# Patient Record
Sex: FEMALE | Race: WHITE | Employment: OTHER | URBAN - METROPOLITAN AREA
[De-identification: names, ages, dates, MRNs, and addresses within clinical notes are randomized per-mention and may not be internally consistent; named-entity substitution may affect disease eponyms.]

---

## 2018-08-13 ENCOUNTER — HOSPITAL ENCOUNTER (OUTPATIENT)
Dept: DIABETES SERVICES | Age: 73
Discharge: HOME | End: 2018-08-13
Attending: FAMILY MEDICINE
Payer: MEDICARE

## 2018-08-13 DIAGNOSIS — E11.9 TYPE 2 DIABETES MELLITUS WITHOUT COMPLICATION, WITHOUT LONG-TERM CURRENT USE OF INSULIN (HCC): ICD-10-CM

## 2018-08-13 PROCEDURE — G0109 DIAB MANAGE TRN IND/GROUP: HCPCS

## 2018-08-16 ENCOUNTER — HOSPITAL ENCOUNTER (OUTPATIENT)
Dept: ULTRASOUND IMAGING | Age: 73
Discharge: HOME | End: 2018-08-16
Attending: INTERNAL MEDICINE
Payer: MEDICARE

## 2018-08-16 DIAGNOSIS — N18.4 CHRONIC KIDNEY DISEASE, STAGE IV (SEVERE) (HCC): ICD-10-CM

## 2018-08-16 PROCEDURE — 76770 US EXAM ABDO BACK WALL COMP: CPT

## 2018-08-20 ENCOUNTER — HOSPITAL ENCOUNTER (OUTPATIENT)
Dept: CT IMAGING | Age: 73
Discharge: HOME | End: 2018-08-20
Attending: INTERNAL MEDICINE

## 2018-08-20 DIAGNOSIS — R93.89 ULTRASOUND SCAN ABNORMAL: ICD-10-CM

## 2018-08-20 DIAGNOSIS — N28.89 KIDNEY MASS: ICD-10-CM

## 2018-08-30 ENCOUNTER — HOSPITAL ENCOUNTER (OUTPATIENT)
Dept: CT IMAGING | Age: 73
Discharge: HOME | End: 2018-08-30
Attending: INTERNAL MEDICINE
Payer: MEDICARE

## 2018-08-30 PROCEDURE — 74150 CT ABDOMEN W/O CONTRAST: CPT

## 2018-09-13 ENCOUNTER — OFFICE VISIT (OUTPATIENT)
Dept: CARDIOLOGY CLINIC | Age: 73
End: 2018-09-13

## 2018-09-13 VITALS
BODY MASS INDEX: 42.51 KG/M2 | HEART RATE: 62 BPM | OXYGEN SATURATION: 95 % | HEIGHT: 64 IN | RESPIRATION RATE: 18 BRPM | SYSTOLIC BLOOD PRESSURE: 132 MMHG | WEIGHT: 249 LBS | DIASTOLIC BLOOD PRESSURE: 64 MMHG

## 2018-09-13 DIAGNOSIS — Z87.891 HISTORY OF TOBACCO USE: ICD-10-CM

## 2018-09-13 DIAGNOSIS — I25.10 CORONARY ARTERY CALCIFICATION SEEN ON CAT SCAN: Primary | ICD-10-CM

## 2018-09-13 DIAGNOSIS — R06.02 SHORT OF BREATH ON EXERTION: ICD-10-CM

## 2018-09-13 DIAGNOSIS — N18.4 CKD (CHRONIC KIDNEY DISEASE) STAGE 4, GFR 15-29 ML/MIN (HCC): ICD-10-CM

## 2018-09-13 DIAGNOSIS — I10 ESSENTIAL HYPERTENSION: ICD-10-CM

## 2018-09-13 PROBLEM — E66.01 OBESITY, MORBID (HCC): Status: ACTIVE | Noted: 2018-09-13

## 2018-09-13 RX ORDER — ASPIRIN 81 MG/1
TABLET ORAL DAILY
COMMUNITY

## 2018-09-13 RX ORDER — ROSUVASTATIN CALCIUM 5 MG/1
5 TABLET, COATED ORAL EVERY OTHER DAY
COMMUNITY
End: 2018-09-13 | Stop reason: SDUPTHER

## 2018-09-13 RX ORDER — CLONIDINE HYDROCHLORIDE 0.2 MG/1
TABLET ORAL 2 TIMES DAILY
COMMUNITY

## 2018-09-13 RX ORDER — ROSUVASTATIN CALCIUM 5 MG/1
5 TABLET, COATED ORAL EVERY OTHER DAY
Qty: 45 TAB | Refills: 3 | Status: SHIPPED | OUTPATIENT
Start: 2018-09-13

## 2018-09-13 RX ORDER — ATENOLOL 25 MG/1
25 TABLET ORAL DAILY
COMMUNITY

## 2018-09-13 RX ORDER — FUROSEMIDE 40 MG/1
TABLET ORAL DAILY
COMMUNITY

## 2018-09-13 RX ORDER — DOXAZOSIN 2 MG/1
1 TABLET ORAL DAILY
COMMUNITY

## 2018-09-13 RX ORDER — CHOLECALCIFEROL TAB 125 MCG (5000 UNIT) 125 MCG
TAB ORAL DAILY
COMMUNITY

## 2018-09-13 RX ORDER — LANOLIN ALCOHOL/MO/W.PET/CERES
500 CREAM (GRAM) TOPICAL DAILY
COMMUNITY

## 2018-09-13 RX ORDER — GLIMEPIRIDE 4 MG/1
TABLET ORAL
COMMUNITY

## 2018-09-13 RX ORDER — DULOXETIN HYDROCHLORIDE 60 MG/1
60 CAPSULE, DELAYED RELEASE ORAL DAILY
COMMUNITY

## 2018-09-13 RX ORDER — AMLODIPINE BESYLATE 5 MG/1
5 TABLET ORAL DAILY
COMMUNITY

## 2018-09-13 RX ORDER — IRBESARTAN 300 MG/1
300 TABLET ORAL
COMMUNITY

## 2018-09-13 RX ORDER — CETIRIZINE HCL 10 MG
TABLET ORAL DAILY
COMMUNITY

## 2018-09-13 RX ORDER — ALLOPURINOL 300 MG/1
TABLET ORAL DAILY
COMMUNITY

## 2018-09-13 NOTE — PROGRESS NOTES
[place]: Singh  [phone]    History of Present Illness:   Ms. Jay Scott is a 73 yo F with a history of chronic kidney disease stage 4 with baseline creatinine of 1.8 - 2.2, history of type 2 diabetes and long NSAID use, secondary hyperparathyroidism, history of CVA in 2013 and subsequent CEA on the left side in 2013, history of knee replacement in 2010, history of tobacco use quit many years ago, referred by Dr. Snow Cherry for cardiac evaluation. She had a recent abdominal CT scan that noted some atrophy of her kidneys, but cardiac wise noted enlargement of her heart, as well as calcification in the coronary arteries in three vessels. From a symptom standpoint, she denies any exertional chest pain. She does have some baseline shortness of breath that is thought to be due to COPD, but this has actually gotten better on medication prescribed by her primary care. She has rare dizziness. She had palpitations in the past, but these resolved on Atenolol. No syncope. She is accompanied by her daughter. She was on Lovastatin for her cholesterol in the past, but had severe muscle aches after a few years and stopped this. She does think she has had echocardiograms and stress tests in the past, but they were overall 'okay' per pt. She was told in the past they had a hesitancy to do a heart catheterization due to her kidney function. But sounds like this never indicated. She is compensated on exam here with clear lungs and trace lower extremity edema. Blood pressure is 132/64 with a heart rate of 62. Her EKG is sinus bradycardia, heart rate of 59 and nonspecific ST-T wave abnormality, low voltage. Soc hx. Tobacco quit 16 yrs ago. Retired now. Followed by Dr. Prince Moon in [place] in the past.  Here with daughter. Fam hx. Brother CAD 76s. Assessment and Plan:   1. Shortness of breath, cardiomegaly. Will obtain an echocardiogram for further evaluation. 2. Coronary artery calcification. This was noted on her CT scan incidentally. If her echo is ok, would focus on prevention. She is already on the appropriate cardiac therapies, but I do think with regard to aspirin blood pressure control, diabetes management. I do think she would benefit from a statin. She had aches/intolerancee to Lovastatin and will avoid this. Will see if she can take generic Crestor 5 mg every other day to start off with. This can be titrated up if tolerated. Tentative f/u in 6 months. 3. Essential hypertension. Blood pressure is controlled. 4. Chronic kidney disease, stage 4. Followed by nephrology, Dr. Sadiq Rangel. 5. COPD. 6. Mixed hyperlipidemia. 7. History of tobacco use. She  has no past medical history on file. All other systems negative except as above. PE  Vitals:    09/13/18 1133   BP: 132/64   Pulse: 62   Resp: 18   SpO2: 95%   Weight: 249 lb (112.9 kg)   Height: 5' 4\" (1.626 m)    Body mass index is 42.74 kg/(m^2). General appearance - alert, well appearing, and in no distress  Mental status - affect appropriate to mood  Eyes - sclera anicteric, moist mucous membranes  Neck - supple, no JVD  Chest - clear to auscultation, no wheezes, rales or rhonchi  Heart - normal rate, regular rhythm, normal S1, S2, I/VI systolic murmur LUSB  Abdomen - soft, nontender, nondistended, no masses or organomegaly  Neurological - no focal deficit  Extremities - peripheral pulses normal, no pedal edema      Recent Labs:  No results found for: CHOL, CHOLX, CHLST, CHOLV, 497492, HDL, LDL, LDLC, DLDLP, TGLX, TRIGL, TRIGP, CHHD, CHHDX  No results found for: MARLON, CREAPOC, ACREA, CREA, REFC3, REFC4  No results found for: BUN, BUNPOC, IBUN, MBUNV, BUNV  No results found for: K, KI, PLK, WBK  No results found for: HBA1C, HGBE8, RUF0MRYO  No results found for: HGBPOC, HGB, HGBP, HGBEXT  No results found for: PLT, PLTEXT    Reviewed:  No past medical history on file.   History   Smoking Status    Former Smoker Smokeless Tobacco    Never Used     History   Alcohol use Not on file     Comment: rarely     No Known Allergies    Current Outpatient Prescriptions   Medication Sig    furosemide (LASIX) 40 mg tablet Take  by mouth daily.  cyanocobalamin (VITAMIN B-12) 500 mcg tablet Take 500 mcg by mouth daily.  glimepiride (AMARYL) 4 mg tablet Take  by mouth every morning.  cetirizine (ZYRTEC) 10 mg tablet Take  by mouth daily.  doxazosin (CARDURA) 2 mg tablet Take 1 mg by mouth daily.  DULoxetine (CYMBALTA) 60 mg capsule Take 60 mg by mouth daily.  amLODIPine (NORVASC) 5 mg tablet Take 5 mg by mouth daily.  allopurinol (ZYLOPRIM) 300 mg tablet Take  by mouth daily.  cloNIDine HCl (CATAPRES) 0.2 mg tablet Take  by mouth two (2) times a day.  atenolol (TENORMIN) 25 mg tablet Take 25 mg by mouth daily.  cholecalciferol, VITAMIN D3, (VITAMIN D3) 5,000 unit tab tablet Take  by mouth daily.  aspirin delayed-release 81 mg tablet Take  by mouth daily.  irbesartan (AVAPRO) 300 mg tablet Take 300 mg by mouth nightly.  umeclidinium-vilanterol (ANORO ELLIPTA) 62.5-25 mcg/actuation inhaler Take 1 Puff by inhalation daily.  insulin NPH/insulin regular (NOVOLIN 70/30 U-100 INSULIN) 100 unit/mL (70-30) injection 60 Units by SubCUTAneous route nightly. No current facility-administered medications for this visit.         Warren Hernandez MD  [facility] and Vascular [place]  [address] [facility] [address]  [address]

## 2018-09-13 NOTE — MR AVS SNAPSHOT
[address] [facility] [address] [place] 
[phone] Patient: Leatha Carrier MRN: [id number] ETX:[id number] Visit Information Date & Time Provider Department Dept. Phone Encounter #  
 9/13/2018 11:40 AM Sixto Venegas MD [facility] [place] [phone] [id number] Your Appointments 9/21/2018  2:00 PM  
ECHO CARDIOGRAMS 2D with VASCULAR, [facility] ([facility] SCHEDULING) Appt Note: echo for SOB per Dr. Kirk Lynn [address] [place] [address]  
[address] [facility]  
  
    
 3/13/2019  1:20 PM  
ESTABLISHED PATIENT with Sixto Venegas MD  
[facility] ([facility]) Appt Note: 6 mo f/u per Dr. Kirk Lynn [address] [place] [address]  
[address] [facility] 7 [address] Upcoming Health Maintenance Date Due DTaP/Tdap/Td series (1 - Tdap) 11/12/1964 BREAST CANCER SCRN MAMMOGRAM 11/12/1993 FOBT Q 1 YEAR AGE 50-75 11/12/1993 ZOSTER VACCINE AGE 60> 9/12/2003 GLAUCOMA SCREENING Q2Y 11/12/2008 Bone Densitometry (Dexa) Screening 11/12/2008 Pneumococcal 65+ Low/Medium Risk (1 of 2 - PCV13) 11/12/2008 Influenza Age 5 to Adult 8/1/2018 Allergies as of 9/13/2018  Review Complete On: 9/13/2018 By: Sierra Foster No Known Allergies Current Immunizations  Never Reviewed No immunizations on file. Not reviewed this visit You Were Diagnosed With   
  
 Codes Comments Essential hypertension    -  Primary ICD-10-CM: I10 
ICD-9-CM: 401.9 Vitals BP Pulse Resp Height(growth percentile) Weight(growth percentile) SpO2  
 132/64 (BP 1 Location: Left arm) 62 18 5' 4\" (1.626 m) 249 lb (112.9 kg) 95% BMI Smoking Status 42.74 kg/m2 Former Smoker Vitals History BMI and BSA Data Body Mass Index Body Surface Area 42.74 kg/m 2 2.26 m 2 Preferred Pharmacy Pharmacy Name Phone [address] [facility] [address] [phone] Your Updated Medication List  
  
   
This list is accurate as of 9/13/18 12:50 PM.  Always use your most recent med list.  
  
  
  
  
 allopurinol 300 mg tablet Commonly known as:  Erik Rist Take  by mouth daily. ANORO ELLIPTA 62.5-25 mcg/actuation inhaler Generic drug:  umeclidinium-vilanterol Take 1 Puff by inhalation daily. aspirin delayed-release 81 mg tablet Take  by mouth daily. atenolol 25 mg tablet Commonly known as:  TENORMIN Take 25 mg by mouth daily. cetirizine 10 mg tablet Commonly known as:  ZYRTEC Take  by mouth daily. cloNIDine HCl 0.2 mg tablet Commonly known as:  CATAPRES Take  by mouth two (2) times a day. CYMBALTA 60 mg capsule Generic drug:  DULoxetine Take 60 mg by mouth daily. doxazosin 2 mg tablet Commonly known as:  CARDURA Take 1 mg by mouth daily. glimepiride 4 mg tablet Commonly known as:  AMARYL Take  by mouth every morning. irbesartan 300 mg tablet Commonly known as:  AVAPRO Take 300 mg by mouth nightly. LASIX 40 mg tablet Generic drug:  furosemide Take  by mouth daily. NORVASC 5 mg tablet Generic drug:  amLODIPine Take 5 mg by mouth daily. NovoLIN 70/30 U-100 Insulin 100 unit/mL (70-30) injection Generic drug:  insulin NPH/insulin regular 60 Units by SubCUTAneous route nightly. rosuvastatin 5 mg tablet Commonly known as:  CRESTOR Take 1 Tab by mouth every other day. VITAMIN B-12 500 mcg tablet Generic drug:  cyanocobalamin Take 500 mcg by mouth daily. VITAMIN D3 5,000 unit Tab tablet Generic drug:  cholecalciferol (VITAMIN D3) Take  by mouth daily. Prescriptions Sent to Pharmacy Refills rosuvastatin (CRESTOR) 5 mg tablet 3 Sig: Take 1 Tab by mouth every other day. Class: Normal  
 Pharmacy: [facility] [address] [facility], [address] [facility] Ph #: [phone] Route: Oral  
  
We Performed the Following AMB POC EKG ROUTINE W/ 12 LEADS, INTER & REP [64116 CPT(R)] Introducing [facility]! [facility] introduces [employer]let patient portal. Now you can access parts of your medical record, email your doctor's office, and request medication refills online. 1. In your internet browser, go to [email or web address] 2. Click on the First Time User? Click Here link in the Sign In box. You will see the New Member Sign Up page. 3. Enter your [employer] Pallet Access Code exactly as it appears below. You will not need to use this code after youve completed the sign-up process. If you do not sign up before the expiration date, you must request a new code. · [employer]let Access Code: [email or web address] Expires: 12/12/2018 12:50 PM 
 
4. Enter the last four digits of your Social Security Number (xxxx) and Date of Birth (mm/dd/yyyy) as indicated and click Submit. You will be taken to the next sign-up page. 5. Create a P[employer] Pallet ID. This will be your P[employer] Pallet login ID and cannot be changed, so think of one that is secure and easy to remember. 6. Create a [employer] Pallet password. You can change your password at any time. 7. Enter your Password Reset Question and Answer. This can be used at a later time if you forget your password. 8. Enter your e-mail address. You will receive e-mail notification when new information is available in [address] 9. Click Sign Up. You can now view and download portions of your medical record. 10. Click the Download Summary menu link to download a portable copy of your medical information. If you have questions, please visit the Frequently Asked Questions section of the P[employer] Pallet website.  Remember, PEC[employer] Pallet is NOT to be used for urgent needs. For medical emergencies, dial 911. Now available from your iPhone and Android! Please provide this summary of care documentation to your next provider. Your primary care clinician is listed as Karin Lowe. If you have any questions after today's visit, please call [phone].

## 2018-09-13 NOTE — LETTER
9/13/2018 5:29 PM 
Patient: Dom Schwartz YOB: 1945 Date of Visit: 9/13/2018 Dear Ángel Baxter MD 
[address] [address] VIA In Basket 
 : Thank you for referring Ms. Dom Schwartz to me for evaluation/treatment. Below are the relevant portions of my assessment and plan of care. Ms. Kilo Lin is a 73 yo F with a history of chronic kidney disease stage 4 with baseline creatinine of 1.8 - 2.2, history of type 2 diabetes and long NSAID use, secondary hyperparathyroidism, history of CVA in 2013 and subsequent CEA on the left side in 2013, history of knee replacement in 2010, history of tobacco use quit many years ago, referred by Dr. hCristin Zelaya for cardiac evaluation. She had a recent abdominal CT scan that noted some atrophy of her kidneys, but cardiac wise noted enlargement of her heart, as well as calcification in the coronary arteries in three vessels. From a symptom standpoint, she denies any exertional chest pain. She does have some baseline shortness of breath that is thought to be due to COPD, but this has actually gotten better on medication prescribed by her primary care. She has rare dizziness. She had palpitations in the past, but these resolved on Atenolol. No syncope. She is accompanied by her daughter. She was on Lovastatin for her cholesterol in the past, but had severe muscle aches after a few years and stopped this. She does think she has had echocardiograms and stress tests in the past, but they were overall 'okay' per pt. She was told in the past they had a hesitancy to do a heart catheterization due to her kidney function. But sounds like this never indicated. She is compensated on exam here with clear lungs and trace lower extremity edema. Blood pressure is 132/64 with a heart rate of 62. Her EKG is sinus bradycardia, heart rate of 59 and nonspecific ST-T wave abnormality, low voltage. Soc hx. Tobacco quit 16 yrs ago. Retired now. Followed by Dr. Zandra Pruitt in [place] in the past.  Here with daughter. Fam hx. Brother CAD 76s. Assessment and Plan: 1. Shortness of breath, cardiomegaly. Will obtain an echocardiogram for further evaluation. 2. Coronary artery calcification. This was noted on her CT scan incidentally. If her echo is ok, would focus on prevention. She is already on the appropriate cardiac therapies, but I do think with regard to aspirin blood pressure control, diabetes management. I do think she would benefit from a statin. She had aches/intolerancee to Lovastatin and will avoid this. Will see if she can take generic Crestor 5 mg every other day to start off with. This can be titrated up if tolerated. Tentative f/u in 6 months. 3. Essential hypertension. Blood pressure is controlled. 4. Chronic kidney disease, stage 4. Followed by nephrology, Dr. Jorge Banegas. 5. COPD. If you have questions, please do not hesitate to call me. Sincerely, Alden Chandler MD

## 2018-09-21 ENCOUNTER — CLINICAL SUPPORT (OUTPATIENT)
Dept: CARDIOLOGY CLINIC | Age: 73
End: 2018-09-21

## 2018-09-21 DIAGNOSIS — I34.81 MITRAL ANNULAR CALCIFICATION: ICD-10-CM

## 2018-09-21 DIAGNOSIS — I51.7 LAE (LEFT ATRIAL ENLARGEMENT): Primary | ICD-10-CM

## 2018-09-21 DIAGNOSIS — N18.4 CKD (CHRONIC KIDNEY DISEASE) STAGE 4, GFR 15-29 ML/MIN (HCC): ICD-10-CM

## 2018-09-21 DIAGNOSIS — Z87.891 HISTORY OF TOBACCO USE: ICD-10-CM

## 2018-09-21 DIAGNOSIS — I25.10 CORONARY ARTERY CALCIFICATION SEEN ON CAT SCAN: ICD-10-CM

## 2018-09-21 DIAGNOSIS — I35.8 AORTIC VALVE SCLEROSIS: ICD-10-CM

## 2018-09-21 DIAGNOSIS — I10 ESSENTIAL HYPERTENSION: ICD-10-CM

## 2018-09-21 DIAGNOSIS — R06.02 SHORT OF BREATH ON EXERTION: ICD-10-CM

## 2018-09-21 DIAGNOSIS — I36.1 TRICUSPID VALVE INCOMPETENCE, NON-RHEUMATIC: ICD-10-CM

## 2018-09-24 ENCOUNTER — TELEPHONE (OUTPATIENT)
Dept: CARDIOLOGY CLINIC | Age: 73
End: 2018-09-24

## 2018-09-24 NOTE — TELEPHONE ENCOUNTER
----- Message from Neptali Navarrete MD sent at 9/24/2018  3:59 PM EDT -----  Please let pt know echo was overall normal. The right ventricle was upper normal size; usually due to COPD.  thx  ----- Message -----     From: David, Card Result In     Sent: 9/24/2018   3:59 PM       To: Neptali Navarrete MD        Above echo results given to patient.   2 pt identifiers used

## 2019-03-26 ENCOUNTER — OFFICE VISIT (OUTPATIENT)
Dept: CARDIOLOGY CLINIC | Age: 74
End: 2019-03-26

## 2019-03-26 VITALS
HEART RATE: 85 BPM | OXYGEN SATURATION: 91 % | HEIGHT: 64 IN | BODY MASS INDEX: 43.71 KG/M2 | WEIGHT: 256 LBS | SYSTOLIC BLOOD PRESSURE: 140 MMHG | DIASTOLIC BLOOD PRESSURE: 60 MMHG | RESPIRATION RATE: 16 BRPM

## 2019-03-26 DIAGNOSIS — I25.10 CORONARY ARTERY CALCIFICATION SEEN ON CAT SCAN: Primary | ICD-10-CM

## 2019-03-26 DIAGNOSIS — N18.4 CKD (CHRONIC KIDNEY DISEASE) STAGE 4, GFR 15-29 ML/MIN (HCC): ICD-10-CM

## 2019-03-26 DIAGNOSIS — Z87.891 HISTORY OF TOBACCO USE: ICD-10-CM

## 2019-03-26 DIAGNOSIS — I10 ESSENTIAL HYPERTENSION: ICD-10-CM

## 2019-03-26 RX ORDER — LOSARTAN POTASSIUM 100 MG/1
TABLET ORAL
COMMUNITY
Start: 2019-03-17

## 2019-03-26 NOTE — LETTER
3/27/2019 8:47 AM 
 
Patient: Zach Dumont YOB: 1945 Date of Visit: 3/26/2019 Dear Shelda Kawasaki, MD 
[address] [place] [id number] [place] [address] [address] VIA Facsimile: [phone] Connie Laughlin MD 
[address] 
[address] [address] VIA In Basket 
 : 
Ms. Maxx Jaquez is a 75 yo F with a history of CKD stage 4 with baseline creatinine of 1.8-2.2, DM2 and long NSAID use, secondary hyperparathyroidism, history of CVA in 2013 and subsequent CEA on the left side in 2013, history of knee replacement in 2010, history of tobacco use quit many years ago. Abdominal CT scan incidentally noted calcification in the coronary arteries. At her last visit due to shortness of breath and cardiomegaly, obtained an echocardiogram and this was normal.  Her right ventricle was upper normal size consistent with COPD. Since her last visit, she denies any exertional chest pain. She does have some baseline shortness of breath, but this is unchanged. She does have chronic cough and has tried allergy medication that does not seem to help. We did talk about doing a trial of reflux medications to see if that might help. She is compensated on exam with clear lungs. She has trace lower extremity edema. Her blood pressure is well controlled and her heart rate is 85. Her weight is overall unchanged. Assessment and Plan: 1. Shortness of breath, cardiomegaly. Her echocardiogram was overall normal and we discussed the results. 2. Coronary artery calcification. This was noted incidentally on a CT scan in the past.  Right now, she is stable and compensated and focus is on optimizing medical management. Unfortunately, she had statin intolerance and last time tried Crestor every other day, but she had aches with this too. At this point, will have her follow back in a year. 3. Essential hypertension. Blood pressure is controlled. 4. Chronic kidney disease, stage 4. Followed by nephrology, Dr. Onur Barba. 5. COPD. 6. Mixed hyperlipidemia. 7. Obesity. She wants to work on exercising more. 8. History of tobacco use. If you have questions, please do not hesitate to call me. Sincerely, Vicente Cronin MD

## 2019-03-26 NOTE — PROGRESS NOTES
[place]: Alonrda Thompson  [phone]    History of Present Illness:   Ms. Isabella Caicedo is a 73 yo F with a history of CKD stage 4 with baseline creatinine of 1.8-2.2, DM2 and long NSAID use, secondary hyperparathyroidism, history of CVA in 2013 and subsequent CEA on the left side in 2013, history of knee replacement in 2010, history of tobacco use quit many years ago. Abdominal CT scan incidentally noted calcification in the coronary arteries. At her last visit due to shortness of breath and cardiomegaly, obtained an echocardiogram and this was normal.  Her right ventricle was upper normal size consistent with COPD. Since her last visit, she denies any exertional chest pain. She does have some baseline shortness of breath, but this is unchanged. She does have chronic cough and has tried allergy medication that does not seem to help. We did talk about doing a trial of reflux medications to see if that might help. She is compensated on exam with clear lungs. She has trace lower extremity edema. Her blood pressure is well controlled and her heart rate is 85. Her weight is overall unchanged. Assessment and Plan:   1. Shortness of breath, cardiomegaly. Her echocardiogram was overall normal and we discussed the results. 2. Coronary artery calcification. This was noted incidentally on a CT scan in the past.  Right now, she is stable and compensated and focus is on optimizing medical management. Unfortunately, she had statin intolerance and last time tried Crestor every other day, but she had aches with this too. At this point, will have her follow back in a year. 3. Essential hypertension. Blood pressure is controlled. 4. Chronic kidney disease, stage 4. Followed by nephrology, Dr. Kesha Thornton. 5. COPD. 6. Mixed hyperlipidemia. 7. Obesity. She wants to work on exercising more. 8. History of tobacco use. She  has no past medical history on file.       All other systems negative except as above. PE  Vitals:    03/26/19 1551   BP: 140/60   Pulse: 85   Resp: 16   SpO2: 91%   Weight: 256 lb (116.1 kg)   Height: 5' 4\" (1.626 m)    Body mass index is 43.94 kg/m². General appearance - alert, well appearing, and in no distress  Mental status - affect appropriate to mood  Eyes - sclera anicteric, moist mucous membranes  Neck - supple, no JVD  Chest - clear to auscultation, no wheezes, rales or rhonchi  Heart - normal rate, regular rhythm, normal S1, S2, I/VI systolic murmur LUSB  Abdomen - soft, nontender, nondistended, no masses or organomegaly  Neurological - no focal deficit  Extremities - peripheral pulses normal, no pedal edema      Recent Labs:  No results found for: CHOL, CHOLX, CHLST, CHOLV, 523021, HDL, LDL, LDLC, DLDLP, TGLX, TRIGL, TRIGP, CHHD, CHHDX  No results found for: MARLON, CREAPOC, ACREA, CREA, REFC3, REFC4  No results found for: BUN, BUNPOC, IBUN, MBUNV, BUNV  No results found for: K, KI, PLK, WBK  No results found for: HBA1C, HGBE8, VQO5TXMK, CAD2TBTP  No results found for: HGBPOC, HGB, HGBP, HGBEXT, HGBEXT  No results found for: PLT, PLTEXT, PLTEXT    Reviewed:  History reviewed. No pertinent past medical history. Social History     Tobacco Use   Smoking Status Former Smoker   Smokeless Tobacco Never Used     Social History     Substance and Sexual Activity   Alcohol Use Not on file    Comment: rarely     No Known Allergies    Current Outpatient Medications   Medication Sig    losartan (COZAAR) 100 mg tablet     furosemide (LASIX) 40 mg tablet Take  by mouth daily.  cyanocobalamin (VITAMIN B-12) 500 mcg tablet Take 500 mcg by mouth daily.  glimepiride (AMARYL) 4 mg tablet Take  by mouth every morning.  cetirizine (ZYRTEC) 10 mg tablet Take  by mouth daily.  doxazosin (CARDURA) 2 mg tablet Take 1 mg by mouth daily.  DULoxetine (CYMBALTA) 60 mg capsule Take 60 mg by mouth daily.  amLODIPine (NORVASC) 5 mg tablet Take 5 mg by mouth daily.     allopurinol (ZYLOPRIM) 300 mg tablet Take  by mouth daily.  cloNIDine HCl (CATAPRES) 0.2 mg tablet Take  by mouth two (2) times a day.  atenolol (TENORMIN) 25 mg tablet Take 25 mg by mouth daily.  cholecalciferol, VITAMIN D3, (VITAMIN D3) 5,000 unit tab tablet Take  by mouth daily.  aspirin delayed-release 81 mg tablet Take  by mouth daily.  umeclidinium-vilanterol (ANORO ELLIPTA) 62.5-25 mcg/actuation inhaler Take 1 Puff by inhalation daily.  insulin NPH/insulin regular (NOVOLIN 70/30 U-100 INSULIN) 100 unit/mL (70-30) injection 60 Units by SubCUTAneous route nightly.  rosuvastatin (CRESTOR) 5 mg tablet Take 1 Tab by mouth every other day.  irbesartan (AVAPRO) 300 mg tablet Take 300 mg by mouth nightly. No current facility-administered medications for this visit.         Myles White MD  [facility] heart and Vascular [place]  [address] [facility] [address]  [address]

## 2019-10-02 ENCOUNTER — OFFICE VISIT (OUTPATIENT)
Dept: CARDIOLOGY CLINIC | Age: 74
End: 2019-10-02

## 2019-10-02 VITALS
SYSTOLIC BLOOD PRESSURE: 102 MMHG | BODY MASS INDEX: 44.22 KG/M2 | HEIGHT: 64 IN | HEART RATE: 57 BPM | WEIGHT: 259 LBS | OXYGEN SATURATION: 97 % | DIASTOLIC BLOOD PRESSURE: 66 MMHG | RESPIRATION RATE: 14 BRPM

## 2019-10-02 DIAGNOSIS — I25.10 CORONARY ARTERY CALCIFICATION SEEN ON CAT SCAN: Primary | ICD-10-CM

## 2019-10-02 DIAGNOSIS — N18.4 CKD (CHRONIC KIDNEY DISEASE) STAGE 4, GFR 15-29 ML/MIN (HCC): ICD-10-CM

## 2019-10-02 DIAGNOSIS — R06.02 SHORT OF BREATH ON EXERTION: ICD-10-CM

## 2019-10-02 DIAGNOSIS — I10 ESSENTIAL HYPERTENSION: ICD-10-CM

## 2019-10-02 DIAGNOSIS — Z87.891 HISTORY OF TOBACCO USE: ICD-10-CM

## 2019-10-02 NOTE — LETTER
10/4/2019 1:50 PM 
 
Patient: Marianna Lopez YOB: 1945 Date of Visit: 10/2/2019 Dear Rudi Panchal MD 
[address] [place] [address] [id number] [facility] 7 [address] VIA Facsimile: [phone] 
 : 
Ms. Gary Thorpe is a 73 yo F with a history of CKD stage 4 with baseline creatinine of 1.8-2.2, DM2 and long NSAID use, secondary hyperparathyroidism, history of CVA in 2013 and subsequent CEA on the left side in 2013, history of knee replacement in 2010, history of tobacco use quit many years ago. Abdominal CT scan incidentally noted calcification in the coronary arteries. Echo was normal with upper limit right ventricle consistent with COPD. Since her last visit, she overall has been doing well. She has some baseline shortness of breath, but this is unchanged. She attributes it to her COPD. No exertional chest pain. No significant palpitations, lightheadedness or dizziness. She has been compliant with her medications. She is compensated on exam with clear lungs. She does have trace lower extremity edema unchanged. Her blood pressure was been well controlled. Her EKG was normal sinus rhythm, heart rate of 69 and nonspecific ST-T wave abnormality. Assessment and Plan: 1. Coronary artery calcification. Incidentally noted on CT scan in the past and focus is on optimizing medical management. She takes Crestor every other day as she is able to, as she has had aches with statin in the past.  Will have her follow back in one year. 2. COPD, shortness of breath. Followed closely by pulmonary. Her echocardiograms in the past were normal.   
3. Essential hypertension. Blood pressure is controlled. 4. Chronic kidney disease, stage 4. Followed by nephrology, Dr. Darvin Rome. 5. Obesity. 6. Mixed hyperlipidemia. 7. History of tobacco use. If you have questions, please do not hesitate to call me. Sincerely, Misael Gutierrez MD

## 2019-10-02 NOTE — PROGRESS NOTES
[place]: Ruslan Bagley  [phone]    History of Present Illness:   Ms. Slava Martinez is a 75 yo F with a history of CKD stage 4 with baseline creatinine of 1.8-2.2, DM2 and long NSAID use, secondary hyperparathyroidism, history of CVA in 2013 and subsequent CEA on the left side in 2013, history of knee replacement in 2010, history of tobacco use quit many years ago. Abdominal CT scan incidentally noted calcification in the coronary arteries. Echo was normal with upper limit right ventricle consistent with COPD. Since her last visit, she overall has been doing well. She has some baseline shortness of breath, but this is unchanged. She attributes it to her COPD. No exertional chest pain. No significant palpitations, lightheadedness or dizziness. She has been compliant with her medications. She is compensated on exam with clear lungs. She does have trace lower extremity edema unchanged. Her blood pressure was been well controlled. Her EKG was normal sinus rhythm, heart rate of 69 and nonspecific ST-T wave abnormality. Assessment and Plan:    1. Coronary artery calcification. Incidentally noted on CT scan in the past and focus is on optimizing medical management. She takes Crestor every other day as she is able to, as she has had aches with statin in the past.  Will have her follow back in one year. 2. COPD, shortness of breath. Followed closely by pulmonary. Her echocardiograms in the past were normal.    3. Essential hypertension. Blood pressure is controlled. 4. Chronic kidney disease, stage 4. Followed by nephrology, Dr. Joslyn Payne. 5. Obesity. 6. Mixed hyperlipidemia. 7. History of tobacco use. She  has no past medical history on file. All other systems negative except as above. PE  Vitals:    10/02/19 1148   BP: 102/66   Pulse: (!) 57   Resp: 14   SpO2: 97%   Weight: 259 lb (117.5 kg)   Height: 5' 4\" (1.626 m)    Body mass index is 44.46 kg/m².    General appearance - alert, well appearing, and in no distress  Mental status - affect appropriate to mood  Eyes - sclera anicteric, moist mucous membranes  Neck - supple, no JVD  Chest - clear to auscultation, no wheezes, rales or rhonchi  Heart - normal rate, regular rhythm, normal S1, S2, I/VI systolic murmur LUSB  Abdomen - soft, nontender, nondistended, no masses or organomegaly  Neurological - no focal deficit  Extremities - peripheral pulses normal, no pedal edema      Recent Labs:  No results found for: CHOL, CHOLX, CHLST, CHOLV, 940058, HDL, HDLP, LDL, LDLC, DLDLP, TGLX, TRIGL, TRIGP, CHHD, CHHDX  No results found for: MARLON, CREAPOC, ACREA, CREA, REFC3, REFC4  No results found for: BUN, BUNPOC, IBUN, MBUNV, BUNV  No results found for: K, KI, PLK, WBK  No results found for: HBA1C, HGBE8, [id number], [id number]  No results found for: HGBPOC, HGB, HGBP, HGBEXT, HGBEXT  No results found for: PLT, PLTEXT, PLTEXT    Reviewed:  History reviewed. No pertinent past medical history. Social History     Tobacco Use   Smoking Status Former Smoker   Smokeless Tobacco Never Used     Social History     Substance and Sexual Activity   Alcohol Use Not on file    Comment: rarely     Allergies   Allergen Reactions    Codeine Itching       Current Outpatient Medications   Medication Sig    losartan (COZAAR) 100 mg tablet     furosemide (LASIX) 40 mg tablet Take  by mouth daily.  cyanocobalamin (VITAMIN B-12) 500 mcg tablet Take 500 mcg by mouth daily.  glimepiride (AMARYL) 4 mg tablet Take  by mouth every morning.  cetirizine (ZYRTEC) 10 mg tablet Take  by mouth daily.  DULoxetine (CYMBALTA) 60 mg capsule Take 60 mg by mouth daily.  amLODIPine (NORVASC) 5 mg tablet Take 5 mg by mouth daily.  allopurinol (ZYLOPRIM) 300 mg tablet Take  by mouth daily.  cloNIDine HCl (CATAPRES) 0.2 mg tablet Take  by mouth two (2) times a day.  atenolol (TENORMIN) 25 mg tablet Take 25 mg by mouth daily.     cholecalciferol, VITAMIN D3, (VITAMIN D3) 5,000 unit tab tablet Take  by mouth daily.  aspirin delayed-release 81 mg tablet Take  by mouth daily.  umeclidinium-vilanterol (ANORO ELLIPTA) 62.5-25 mcg/actuation inhaler Take 1 Puff by inhalation daily.  insulin NPH/insulin regular (NOVOLIN 70/30 U-100 INSULIN) 100 unit/mL (70-30) injection 60 Units by SubCUTAneous route nightly.  rosuvastatin (CRESTOR) 5 mg tablet Take 1 Tab by mouth every other day.  ONETOUCH ULTRA BLUE TEST STRIP strip USE 1 STRIP TO CHECK GLUCOSE THREE TIMES DAILY FOR 90 DAYS    doxazosin (CARDURA) 2 mg tablet Take 1 mg by mouth daily.  irbesartan (AVAPRO) 300 mg tablet Take 300 mg by mouth nightly. No current facility-administered medications for this visit.         Grace Cheema MD  [facility] heart and Vascular [place]  [address], [address] [facility] 83,[address]  [address]

## 2020-01-01 ENCOUNTER — HOSPITAL ENCOUNTER (OUTPATIENT)
Dept: NUCLEAR MEDICINE | Age: 75
Discharge: HOME | End: 2020-11-10
Attending: INTERNAL MEDICINE
Payer: MEDICARE

## 2020-01-01 ENCOUNTER — HOSPITAL ENCOUNTER (OUTPATIENT)
Dept: MRI IMAGING | Age: 75
Discharge: HOME | End: 2020-06-04
Attending: PHYSICAL MEDICINE & REHABILITATION
Payer: MEDICARE

## 2020-01-01 ENCOUNTER — OFFICE VISIT (OUTPATIENT)
Dept: CARDIOLOGY CLINIC | Age: 75
End: 2020-01-01
Payer: MEDICARE

## 2020-01-01 ENCOUNTER — HOSPITAL ENCOUNTER (OUTPATIENT)
Dept: NON INVASIVE DIAGNOSTICS | Age: 75
Discharge: HOME | End: 2020-11-05
Attending: INTERNAL MEDICINE
Payer: MEDICARE

## 2020-01-01 ENCOUNTER — TELEPHONE (OUTPATIENT)
Dept: CARDIOLOGY CLINIC | Age: 75
End: 2020-01-01

## 2020-01-01 ENCOUNTER — HOSPITAL ENCOUNTER (OUTPATIENT)
Dept: NON INVASIVE DIAGNOSTICS | Age: 75
Discharge: HOME | End: 2020-11-09
Attending: INTERNAL MEDICINE

## 2020-01-01 ENCOUNTER — HOSPITAL ENCOUNTER (OUTPATIENT)
Dept: NON INVASIVE DIAGNOSTICS | Age: 75
Discharge: HOME | End: 2020-11-10
Attending: INTERNAL MEDICINE
Payer: MEDICARE

## 2020-01-01 ENCOUNTER — HOSPITAL ENCOUNTER (OUTPATIENT)
Dept: NUCLEAR MEDICINE | Age: 75
End: 2020-01-01
Attending: INTERNAL MEDICINE

## 2020-01-01 VITALS
DIASTOLIC BLOOD PRESSURE: 68 MMHG | OXYGEN SATURATION: 97 % | HEIGHT: 64 IN | HEART RATE: 72 BPM | SYSTOLIC BLOOD PRESSURE: 120 MMHG | RESPIRATION RATE: 20 BRPM | WEIGHT: 263 LBS | BODY MASS INDEX: 44.9 KG/M2

## 2020-01-01 VITALS
HEIGHT: 64 IN | HEART RATE: 60 BPM | DIASTOLIC BLOOD PRESSURE: 70 MMHG | SYSTOLIC BLOOD PRESSURE: 130 MMHG | BODY MASS INDEX: 45.14 KG/M2 | RESPIRATION RATE: 20 BRPM | OXYGEN SATURATION: 91 %

## 2020-01-01 VITALS
HEIGHT: 64 IN | WEIGHT: 263 LBS | DIASTOLIC BLOOD PRESSURE: 61 MMHG | SYSTOLIC BLOOD PRESSURE: 137 MMHG | BODY MASS INDEX: 44.9 KG/M2

## 2020-01-01 DIAGNOSIS — N18.4 CKD (CHRONIC KIDNEY DISEASE) STAGE 4, GFR 15-29 ML/MIN (HCC): ICD-10-CM

## 2020-01-01 DIAGNOSIS — J43.8 OTHER EMPHYSEMA (HCC): ICD-10-CM

## 2020-01-01 DIAGNOSIS — I10 ESSENTIAL HYPERTENSION: ICD-10-CM

## 2020-01-01 DIAGNOSIS — M54.50 LUMBAR PAIN: ICD-10-CM

## 2020-01-01 DIAGNOSIS — I20.0 UNSTABLE ANGINA (HCC): ICD-10-CM

## 2020-01-01 DIAGNOSIS — I25.10 CORONARY ARTERY CALCIFICATION SEEN ON CAT SCAN: ICD-10-CM

## 2020-01-01 DIAGNOSIS — M47.817 LUMBOSACRAL SPONDYLOSIS WITHOUT MYELOPATHY: ICD-10-CM

## 2020-01-01 DIAGNOSIS — I25.10 CORONARY ARTERY CALCIFICATION SEEN ON CAT SCAN: Primary | ICD-10-CM

## 2020-01-01 DIAGNOSIS — I49.1 PAC (PREMATURE ATRIAL CONTRACTION): ICD-10-CM

## 2020-01-01 DIAGNOSIS — I20.0 UNSTABLE ANGINA (HCC): Primary | ICD-10-CM

## 2020-01-01 LAB
ECHO AV AREA PEAK VELOCITY: 1.37 CM2
ECHO AV AREA PEAK VELOCITY: 1.42 CM2
ECHO AV AREA VTI: 1.65 CM2
ECHO AV MEAN GRADIENT: 12.18 MMHG
ECHO AV PEAK GRADIENT: 21.27 MMHG
ECHO AV PEAK VELOCITY: 230.59 CM/S
ECHO AV VTI: 50.07 CM
ECHO LA TO AORTIC ROOT RATIO: 0
ECHO LV INTERNAL DIMENSION DIASTOLIC: 6.12 CM (ref 3.9–5.3)
ECHO LV INTERNAL DIMENSION SYSTOLIC: 3.83 CM
ECHO LV IVSD: 0.76 CM (ref 0.6–0.9)
ECHO LV MASS 2D: 212.4 G (ref 67–162)
ECHO LV POSTERIOR WALL DIASTOLIC: 0.97 CM (ref 0.6–0.9)
ECHO LVOT DIAM: 1.91 CM
ECHO LVOT PEAK GRADIENT: 4.85 MMHG
ECHO LVOT PEAK GRADIENT: 5.22 MMHG
ECHO LVOT PEAK VELOCITY: 110.17 CM/S
ECHO LVOT PEAK VELOCITY: 114.24 CM/S
ECHO LVOT SV: 82.6 ML
ECHO LVOT VTI: 28.78 CM
ECHO MV A VELOCITY: 59.86 CM/S
ECHO MV E VELOCITY: 69.81 CM/S
ECHO MV E/A RATIO: 1.17
ECHO RV TAPSE: 2.13 CM (ref 1.5–2)
STRESS BASELINE DIAS BP: 62 MMHG
STRESS BASELINE HR: 80 BPM
STRESS BASELINE SYS BP: 136 MMHG
STRESS ESTIMATED WORKLOAD: 1 METS
STRESS EXERCISE DUR MIN: NORMAL
STRESS PEAK DIAS BP: 61 MMHG
STRESS PEAK SYS BP: 137 MMHG
STRESS PERCENT HR ACHIEVED: 57 %
STRESS POST PEAK HR: 82 BPM
STRESS RATE PRESSURE PRODUCT: NORMAL BPM*MMHG
STRESS ST DEPRESSION: 0 MM
STRESS ST ELEVATION: 0 MM
STRESS TARGET HR: 144 BPM

## 2020-01-01 PROCEDURE — 93017 CV STRESS TEST TRACING ONLY: CPT

## 2020-01-01 PROCEDURE — 1090F PRES/ABSN URINE INCON ASSESS: CPT | Performed by: INTERNAL MEDICINE

## 2020-01-01 PROCEDURE — G8400 PT W/DXA NO RESULTS DOC: HCPCS | Performed by: INTERNAL MEDICINE

## 2020-01-01 PROCEDURE — G8417 CALC BMI ABV UP PARAM F/U: HCPCS | Performed by: INTERNAL MEDICINE

## 2020-01-01 PROCEDURE — G8510 SCR DEP NEG, NO PLAN REQD: HCPCS | Performed by: INTERNAL MEDICINE

## 2020-01-01 PROCEDURE — 1101F PT FALLS ASSESS-DOCD LE1/YR: CPT | Performed by: INTERNAL MEDICINE

## 2020-01-01 PROCEDURE — 99214 OFFICE O/P EST MOD 30 MIN: CPT | Performed by: INTERNAL MEDICINE

## 2020-01-01 PROCEDURE — G8754 DIAS BP LESS 90: HCPCS | Performed by: INTERNAL MEDICINE

## 2020-01-01 PROCEDURE — G8432 DEP SCR NOT DOC, RNG: HCPCS | Performed by: INTERNAL MEDICINE

## 2020-01-01 PROCEDURE — G8427 DOCREV CUR MEDS BY ELIG CLIN: HCPCS | Performed by: INTERNAL MEDICINE

## 2020-01-01 PROCEDURE — 78451 HT MUSCLE IMAGE SPECT SING: CPT

## 2020-01-01 PROCEDURE — G8536 NO DOC ELDER MAL SCRN: HCPCS | Performed by: INTERNAL MEDICINE

## 2020-01-01 PROCEDURE — 93000 ELECTROCARDIOGRAM COMPLETE: CPT | Performed by: INTERNAL MEDICINE

## 2020-01-01 PROCEDURE — 74011250636 HC RX REV CODE- 250/636: Performed by: INTERNAL MEDICINE

## 2020-01-01 PROCEDURE — G8752 SYS BP LESS 140: HCPCS | Performed by: INTERNAL MEDICINE

## 2020-01-01 PROCEDURE — 93306 TTE W/DOPPLER COMPLETE: CPT

## 2020-01-01 PROCEDURE — 72148 MRI LUMBAR SPINE W/O DYE: CPT

## 2020-01-01 RX ORDER — FLUTICASONE FUROATE, UMECLIDINIUM BROMIDE AND VILANTEROL TRIFENATATE 100; 62.5; 25 UG/1; UG/1; UG/1
POWDER RESPIRATORY (INHALATION)
COMMUNITY
Start: 2020-01-01

## 2020-01-01 RX ORDER — ALBUTEROL SULFATE 0.83 MG/ML
SOLUTION RESPIRATORY (INHALATION)
COMMUNITY
Start: 2020-01-01

## 2020-01-01 RX ORDER — SODIUM CHLORIDE 0.9 % (FLUSH) 0.9 %
20 SYRINGE (ML) INJECTION
Status: COMPLETED | OUTPATIENT
Start: 2020-01-01 | End: 2020-01-01

## 2020-01-01 RX ORDER — ALBUTEROL SULFATE 90 UG/1
AEROSOL, METERED RESPIRATORY (INHALATION)
COMMUNITY
Start: 2020-01-01

## 2020-01-01 RX ADMIN — REGADENOSON 0.4 MG: 0.08 INJECTION, SOLUTION INTRAVENOUS at 08:43

## 2020-01-01 RX ADMIN — Medication 20 ML: at 08:43

## 2020-10-05 NOTE — PROGRESS NOTES
[place]: Singh 
[phone] History of Present Illness:   Ms. Ambar Eckert is a 65 yo F with a history of CKD stage 4 with baseline creatinine of 1.8-2.2, DM2 and long NSAID use, secondary hyperparathyroidism, history of CVA in 2013 and subsequent CEA on the left side in 2013, history of knee replacement in 2010, history of tobacco use quit many years ago. Abdominal CT scan incidentally noted calcification in the coronary arteries. Echo was normal with upper limit right ventricle consistent with COPD. Since her last visit, overall she has been doing okay. She does have baseline shortness of breath with exertion, but this is unchanged attributed to her COPD. No exertional chest pain. She has rare palpitations. No lightheadedness or dizziness. She does express frustrations about not being able to go to the gym, but has been trying to do some exercising at home given her arthritis in the knees. She is compensated on exam with clear lungs. She has trace lower extremity edema unchanged. Her blood pressure was 120/68 with a heart rate of 72. Her EKG was normal sinus rhythm, heart rate of 74. There were occasional premature atrial contractions. Assessment and Plan: 1. Coronary artery calcification. Noted incidentally on CT in the past and will continue medical management. She takes Crestor every other day, as she had aches with statins in the past.   No additional cardiac evaluation is indicated at this time. She will follow back in one year. 2. COPD, shortness of breath. Followed closely by pulmonary. Echocardiograms in the past were normal.   
3. Essential hypertension. Blood pressure is controlled. 4. Chronic kidney disease, stage 4. Followed by Nephrology, Dr. Barbara Ho and this has been stable per the patient. 5. Palpitations. Occasional PACs. 6. Mixed hyperlipidemia. 7. History of tobacco use. 8. Obesity. She  has no past medical history on file. All other systems negative except as above. PE 
Vitals:  
 10/05/20 1348 BP: 120/68 Pulse: 72 Resp: 20 SpO2: 97% Weight: 263 lb (119.3 kg) Height: 5' 4\" (1.626 m) Body mass index is 45.14 kg/m². General appearance - alert, well appearing, and in no distress Mental status - affect appropriate to mood Eyes - sclera anicteric, moist mucous membranes Neck - supple, no JVD Chest - clear to auscultation, no wheezes, rales or rhonchi Heart - normal rate, regular rhythm, normal S1, S2, I/VI systolic murmur LUSB Abdomen - soft, nontender, nondistended, no masses or organomegaly Neurological - no focal deficit Extremities - peripheral pulses normal, no pedal edema Recent Labs: 
No results found for: CHOL, CHOLX, CHLST, CHOLV, [id number], HDL, HDLP, LDL, LDLC, DLDLP, TGLX, TRIGL, TRIGP, CHHD, CHHDX No results found for: [place], 300 [facility], 530 [facility], [id number] [place] [address], 615 [facility], 100 [facility] No results found for: BUN, BUNPOC, [address] [facility], [address] [facility] [address], BUNV No results found for: K, KI, PLK, WBK No results found for: HBA1C, HGBE8, [id number], ZND3EIED No results found for: HGBPOC, HGB, HGBP, HGBEXT, HGBEXT No results found for: PLT, PLTEXT, PLTEXT Reviewed: 
No past medical history on file. Social History Tobacco Use Smoking Status Former Smoker Smokeless Tobacco Never Used Social History Substance and Sexual Activity Alcohol Use Yes Comment: rarely Allergies Allergen Reactions  Ibuprofen Itching Due to kidney failure, cant take any NSAID  Codeine Itching Current Outpatient Medications Medication Sig  
 albuterol (PROVENTIL VENTOLIN) 2.5 mg /3 mL (0.083 %) nebu USE 1 VIAL IN NEBULIZER EVERY 4 HOURS AS NEEDED  
 albuterol (PROVENTIL HFA, VENTOLIN HFA, PROAIR HFA) 90 mcg/actuation inhaler  Trelegy Ellipta 100-62.5-25 mcg inhaler INHALE 1 PUFF ONCE DAILY  ONETOUCH ULTRA BLUE TEST STRIP strip USE 1 STRIP TO CHECK GLUCOSE THREE TIMES DAILY FOR 90 DAYS  furosemide (LASIX) 40 mg tablet Take  by mouth daily.  cyanocobalamin (VITAMIN B-12) 500 mcg tablet Take 500 mcg by mouth daily.  glimepiride (AMARYL) 4 mg tablet Take  by mouth every morning.  cetirizine (ZYRTEC) 10 mg tablet Take  by mouth daily.  DULoxetine (CYMBALTA) 60 mg capsule Take 60 mg by mouth daily.  amLODIPine (NORVASC) 5 mg tablet Take 5 mg by mouth daily.  allopurinol (ZYLOPRIM) 300 mg tablet Take  by mouth daily.  cloNIDine HCl (CATAPRES) 0.2 mg tablet Take  by mouth two (2) times a day.  atenolol (TENORMIN) 25 mg tablet Take 25 mg by mouth daily.  cholecalciferol, VITAMIN D3, (VITAMIN D3) 5,000 unit tab tablet Take  by mouth daily.  aspirin delayed-release 81 mg tablet Take  by mouth daily.  umeclidinium-vilanterol (ANORO ELLIPTA) 62.5-25 mcg/actuation inhaler Take 1 Puff by inhalation daily.  insulin NPH/insulin regular (NOVOLIN 70/30 U-100 INSULIN) 100 unit/mL (70-30) injection 60 Units by SubCUTAneous route nightly.  losartan (COZAAR) 100 mg tablet  doxazosin (CARDURA) 2 mg tablet Take 1 mg by mouth daily.  irbesartan (AVAPRO) 300 mg tablet Take 300 mg by mouth nightly.  rosuvastatin (CRESTOR) 5 mg tablet Take 1 Tab by mouth every other day. No current facility-administered medications for this visit. Arleth Baker MD 
[facility] heart and Vascular [place] [address], [address] [facility], [address] [place] [address]

## 2020-10-05 NOTE — LETTER
10/5/2020 11:27 PM 
 
Patient: Kishan Lafleur YOB: 1945 Date of Visit: 10/5/2020 Dear Quinton Greenwood MD 
[address] [place] [address] [id number] [facility] [phone] [id number] VIA Facsimile: [phone]: Ms. Michelle Minor is a 67 yo F with a history of CKD stage 4 with baseline creatinine of 1.8-2.2, DM2 and long NSAID use, secondary hyperparathyroidism, history of CVA in 2013 and subsequent CEA on the left side in 2013, history of knee replacement in 2010, history of tobacco use quit many years ago. Abdominal CT scan incidentally noted calcification in the coronary arteries. Echo was normal with upper limit right ventricle consistent with COPD. Since her last visit, overall she has been doing okay. She does have baseline shortness of breath with exertion, but this is unchanged attributed to her COPD. No exertional chest pain. She has rare palpitations. No lightheadedness or dizziness. She does express frustrations about not being able to go to the gym, but has been trying to do some exercising at home given her arthritis in the knees. She is compensated on exam with clear lungs. She has trace lower extremity edema unchanged. Her blood pressure was 120/68 with a heart rate of 72. Her EKG was normal sinus rhythm, heart rate of 74. There were occasional premature atrial contractions. Assessment and Plan: 1. Coronary artery calcification. Noted incidentally on CT in the past and will continue medical management. She takes Crestor every other day, as she had aches with statins in the past.   No additional cardiac evaluation is indicated at this time. She will follow back in one year. 2. COPD, shortness of breath. Followed closely by pulmonary. Echocardiograms in the past were normal.   
3. Essential hypertension. Blood pressure is controlled. 4. Chronic kidney disease, stage 4. Followed by Nephrology, Dr. Allyn Wray and this has been stable per the patient. 5. Palpitations. Occasional PACs. 6. Mixed hyperlipidemia. 7. History of tobacco use. 8. Obesity. If you have questions, please do not hesitate to call me. Sincerely, Betito eLe MD

## 2020-10-28 NOTE — TELEPHONE ENCOUNTER
Patient was at PCP's office 2 days ago - experienced SOB and high BP level that was concerning. PCP's PA is now calling stating that patient needs to be seen by Dr. Tamiko Pete in a week.  Lab results are being faxed over to Dr. Tamiko Pete. Patient is currently scheduled to see Dr. Tamiko Pete on 11/04 at 10:40am.

## 2020-10-29 NOTE — TELEPHONE ENCOUNTER
Patient already rescheduled to a sooner appointment.      Future Appointments   Date Time Provider Fernanda Vijaya   10/30/2020 11:20 AM MD ADAM Calloway   10/4/2021  1:20 PM MD ADAM Calloway AMB

## 2020-10-30 NOTE — PROGRESS NOTES
[place]:  
[phone] History of Present Illness:   Ms. Norma Deutsch is a 67 yo F with a history of CKD stage 4 with baseline creatinine of 1.8-2.2, DM2 and long NSAID use, secondary hyperparathyroidism, history of CVA in 2013 and subsequent CEA on the left side in 2013, history of knee replacement in 2010, history of tobacco use quit many years ago. Abdominal CT scan incidentally noted calcification in the coronary arteries. Echo was normal with upper limit right ventricle consistent with COPD. She is here now due to shortness of breath that started about a week ago with some shortness of breath and some congestion. She saw her primary care physician on Monday and was started on steroids and Mucinex. She also had a chest x-ray done and was told she was clear. She denies any chest pain. No significant palpitations. She is compensated on exam with clear lungs and trace chronic lower extremity edema, unchanged. Blood pressure here is 130/70 with a heart rate of 60. Assessment and Plan: 1. Unstable angina. Exertional shortness of breath, possible anginal equivalent; will proceed with a stress test and echocardiogram for further evaluation. She does have a history of coronary artery calcification that was noted incidentally on a CT in the past.  Also think she would benefit from seeing pulmonary and will try to set up a referral to pulmonary. 2. COPD. 3. Essential hypertension. Blood pressure is controlled. 4. Chronic kidney disease, stage 4. Followed by nephrology, Dr. Yanelis Garcia. 5. Palpitations. Occasional PACs. 6. Mixed hyperlipidemia. 7. Morbid obesity. 8. History of tobacco use. She  has no past medical history on file. All other systems negative except as above. PE 
Vitals:  
 10/30/20 1123 BP: 130/70 Pulse: 60 Resp: 20 SpO2: 91% Height: 5' 4\" (1.626 m) Body mass index is 45.14 kg/m².  
 General appearance - alert, well appearing, and in no distress Mental status - affect appropriate to mood Eyes - sclera anicteric, moist mucous membranes Neck - supple, no JVD Chest - clear to auscultation, no wheezes, rales or rhonchi Heart - normal rate, regular rhythm, normal S1, S2, I/VI systolic murmur LUSB Abdomen - soft, nontender, nondistended, no masses or organomegaly Neurological - no focal deficit Extremities - peripheral pulses normal, no pedal edema Recent Labs: 
No results found for: CHOL, CHOLX, CHLST, CHOLV, [id number], HDL, HDLP, LDL, LDLC, DLDLP, TGLX, TRIGL, TRIGP, CHHD, CHHDX No results found for: [place], 300 [facility], 530 [facility], [id number] [place] [address], 615 [facility], 100 [facility] No results found for: BUN, BUNPOC, 49 [facility], 202 [facility], BUNV No results found for: K, KI, PLK, WBK No results found for: HBA1C, HGBE8, [id number], [id number] No results found for: HGBPOC, HGB, HGBP, HGBEXT, HGBEXT No results found for: PLT, PLTEXT, PLTEXT Reviewed: 
No past medical history on file. Social History Tobacco Use Smoking Status Former Smoker Smokeless Tobacco Never Used Social History Substance and Sexual Activity Alcohol Use Not Currently Comment: rarely Allergies Allergen Reactions  Ibuprofen Itching Due to kidney failure, cant take any NSAID  Codeine Itching Current Outpatient Medications Medication Sig  
 albuterol (PROVENTIL VENTOLIN) 2.5 mg /3 mL (0.083 %) nebu USE 1 VIAL IN NEBULIZER EVERY 4 HOURS AS NEEDED  
 albuterol (PROVENTIL HFA, VENTOLIN HFA, PROAIR HFA) 90 mcg/actuation inhaler  Trelegy Ellipta 100-62.5-25 mcg inhaler INHALE 1 PUFF ONCE DAILY  ONETOUCH ULTRA BLUE TEST STRIP strip USE 1 STRIP TO CHECK GLUCOSE THREE TIMES DAILY FOR 90 DAYS  furosemide (LASIX) 40 mg tablet Take  by mouth daily.  cyanocobalamin (VITAMIN B-12) 500 mcg tablet Take 500 mcg by mouth daily.  glimepiride (AMARYL) 4 mg tablet Take  by mouth every morning.  cetirizine (ZYRTEC) 10 mg tablet Take  by mouth daily.   
 DULoxetine (CYMBALTA) 60 mg capsule Take 60 mg by mouth daily.  amLODIPine (NORVASC) 5 mg tablet Take 5 mg by mouth daily.  allopurinol (ZYLOPRIM) 300 mg tablet Take  by mouth daily.  cloNIDine HCl (CATAPRES) 0.2 mg tablet Take  by mouth two (2) times a day.  atenolol (TENORMIN) 25 mg tablet Take 25 mg by mouth daily.  cholecalciferol, VITAMIN D3, (VITAMIN D3) 5,000 unit tab tablet Take  by mouth daily.  aspirin delayed-release 81 mg tablet Take  by mouth daily.  umeclidinium-vilanterol (ANORO ELLIPTA) 62.5-25 mcg/actuation inhaler Take 1 Puff by inhalation daily.  insulin NPH/insulin regular (NOVOLIN 70/30 U-100 INSULIN) 100 unit/mL (70-30) injection 60 Units by SubCUTAneous route nightly.  rosuvastatin (CRESTOR) 5 mg tablet Take 1 Tab by mouth every other day.  losartan (COZAAR) 100 mg tablet  doxazosin (CARDURA) 2 mg tablet Take 1 mg by mouth daily.  irbesartan (AVAPRO) 300 mg tablet Take 300 mg by mouth nightly. No current facility-administered medications for this visit. Rolly Corrales MD 
[facility] heart and Vascular [place] [address] [facility], [address] [place] [address]

## 2020-10-30 NOTE — LETTER
10/30/2020 12:45 PM 
 
Patient: Nehemiah Latham YOB: 1945 Date of Visit: 10/30/2020 Dear Paulie Medina MD 
[address] [place] [address] [id number] [facility] [id number] VIA Facsimile: [phone]: Ms. Demetrice Whitney is a 67 yo F with a history of CKD stage 4 with baseline creatinine of 1.8-2.2, DM2 and long NSAID use, secondary hyperparathyroidism, history of CVA in 2013 and subsequent CEA on the left side in 2013, history of knee replacement in 2010, history of tobacco use quit many years ago. Abdominal CT scan incidentally noted calcification in the coronary arteries. Echo was normal with upper limit right ventricle consistent with COPD. She is here now due to shortness of breath that started about a week ago with some shortness of breath and some congestion. She saw her primary care physician on Monday and was started on steroids and Mucinex. She also had a chest x-ray done and was told she was clear. She denies any chest pain. No significant palpitations. She is compensated on exam with clear lungs and trace chronic lower extremity edema, unchanged. Blood pressure here is 130/70 with a heart rate of 60. Assessment and Plan: 1. Unstable angina. Exertional shortness of breath, possible anginal equivalent; will proceed with a stress test and echocardiogram for further evaluation. She does have a history of coronary artery calcification that was noted incidentally on a CT in the past.  Also think she would benefit from seeing pulmonary and will try to set up a referral to pulmonary. 2. COPD. 3. Essential hypertension. Blood pressure is controlled. 4. Chronic kidney disease, stage 4. Followed by nephrology, Dr. Cinthia Kanner. 5. Palpitations. Occasional PACs. 6. Mixed hyperlipidemia. 7. Morbid obesity. 8. History of tobacco use. If you have questions, please do not hesitate to call me. Sincerely, Davian Randall MD

## 2020-11-10 NOTE — TELEPHONE ENCOUNTER
Two patient indentifiers verified. Pt was given test results. Pt denies any further question at this time and verbalized understanding.

## 2020-11-10 NOTE — TELEPHONE ENCOUNTER
----- Message from Alan Malik MD sent at 11/10/2020  2:43 PM EST -----  Please let pt know stress test was normal. Can keep her 1 yr follow up with me. MD Sam Beyer, RN               Please let pt know echo was overall normal. Normal LV function, slight stiffness of the aortic valve. There was a very small amount of fluid around the heart, not anything to worry or doing anything about.  rkx

## 2021-01-01 ENCOUNTER — HOSPITAL ENCOUNTER (INPATIENT)
Age: 76
LOS: 4 days | Discharge: HOSPICE | DRG: 190 | End: 2021-04-20
Attending: EMERGENCY MEDICINE | Admitting: INTERNAL MEDICINE
Payer: MEDICARE

## 2021-01-01 ENCOUNTER — APPOINTMENT (OUTPATIENT)
Dept: ULTRASOUND IMAGING | Age: 76
DRG: 190 | End: 2021-01-01
Attending: FAMILY MEDICINE
Payer: MEDICARE

## 2021-01-01 ENCOUNTER — APPOINTMENT (OUTPATIENT)
Dept: CT IMAGING | Age: 76
DRG: 190 | End: 2021-01-01
Attending: INTERNAL MEDICINE
Payer: MEDICARE

## 2021-01-01 ENCOUNTER — APPOINTMENT (OUTPATIENT)
Dept: GENERAL RADIOLOGY | Age: 76
DRG: 190 | End: 2021-01-01
Attending: FAMILY MEDICINE
Payer: MEDICARE

## 2021-01-01 ENCOUNTER — APPOINTMENT (OUTPATIENT)
Dept: ULTRASOUND IMAGING | Age: 76
DRG: 190 | End: 2021-01-01
Attending: INTERNAL MEDICINE
Payer: MEDICARE

## 2021-01-01 ENCOUNTER — APPOINTMENT (OUTPATIENT)
Dept: GENERAL RADIOLOGY | Age: 76
DRG: 190 | End: 2021-01-01
Attending: EMERGENCY MEDICINE
Payer: MEDICARE

## 2021-01-01 ENCOUNTER — HOSPITAL ENCOUNTER (OUTPATIENT)
Dept: CT IMAGING | Age: 76
Discharge: HOME | End: 2021-01-13
Attending: INTERNAL MEDICINE
Payer: MEDICARE

## 2021-01-01 ENCOUNTER — APPOINTMENT (OUTPATIENT)
Dept: VASCULAR SURGERY | Age: 76
DRG: 190 | End: 2021-01-01
Attending: INTERNAL MEDICINE
Payer: MEDICARE

## 2021-01-01 ENCOUNTER — APPOINTMENT (OUTPATIENT)
Dept: NON INVASIVE DIAGNOSTICS | Age: 76
DRG: 190 | End: 2021-01-01
Attending: FAMILY MEDICINE
Payer: MEDICARE

## 2021-01-01 ENCOUNTER — HOSPITAL ENCOUNTER (INPATIENT)
Age: 76
LOS: 2 days | DRG: 951 | End: 2021-04-22
Attending: FAMILY MEDICINE | Admitting: FAMILY MEDICINE
Payer: OTHER MISCELLANEOUS

## 2021-01-01 ENCOUNTER — APPOINTMENT (OUTPATIENT)
Dept: NUCLEAR MEDICINE | Age: 76
DRG: 190 | End: 2021-01-01
Attending: INTERNAL MEDICINE
Payer: MEDICARE

## 2021-01-01 ENCOUNTER — APPOINTMENT (OUTPATIENT)
Dept: CT IMAGING | Age: 76
DRG: 190 | End: 2021-01-01
Attending: UROLOGY
Payer: MEDICARE

## 2021-01-01 ENCOUNTER — TRANSCRIBE ORDER (OUTPATIENT)
Dept: SCHEDULING | Age: 76
End: 2021-01-01

## 2021-01-01 ENCOUNTER — HOSPICE ADMISSION (OUTPATIENT)
Dept: HOSPICE | Facility: HOSPICE | Age: 76
End: 2021-01-01
Payer: MEDICARE

## 2021-01-01 VITALS
HEIGHT: 64 IN | SYSTOLIC BLOOD PRESSURE: 102 MMHG | HEART RATE: 82 BPM | TEMPERATURE: 97.9 F | BODY MASS INDEX: 45.36 KG/M2 | DIASTOLIC BLOOD PRESSURE: 58 MMHG | OXYGEN SATURATION: 98 % | WEIGHT: 265.7 LBS | RESPIRATION RATE: 15 BRPM

## 2021-01-01 VITALS
RESPIRATION RATE: 18 BRPM | DIASTOLIC BLOOD PRESSURE: 30 MMHG | HEART RATE: 59 BPM | OXYGEN SATURATION: 97 % | SYSTOLIC BLOOD PRESSURE: 59 MMHG

## 2021-01-01 DIAGNOSIS — R06.09 DOE (DYSPNEA ON EXERTION): ICD-10-CM

## 2021-01-01 DIAGNOSIS — N28.9 RENAL INSUFFICIENCY: ICD-10-CM

## 2021-01-01 DIAGNOSIS — J44.9 CHRONIC OBSTRUCTIVE PULMONARY DISEASE, UNSPECIFIED COPD TYPE (HCC): ICD-10-CM

## 2021-01-01 DIAGNOSIS — R41.89 UNRESPONSIVENESS: ICD-10-CM

## 2021-01-01 DIAGNOSIS — R52 GENERALIZED PAIN: ICD-10-CM

## 2021-01-01 DIAGNOSIS — R45.1 RESTLESSNESS: ICD-10-CM

## 2021-01-01 DIAGNOSIS — R09.02 HYPOXIA: Primary | ICD-10-CM

## 2021-01-01 DIAGNOSIS — R79.89 ELEVATED BRAIN NATRIURETIC PEPTIDE (BNP) LEVEL: ICD-10-CM

## 2021-01-01 DIAGNOSIS — J44.9 COPD (CHRONIC OBSTRUCTIVE PULMONARY DISEASE) (HCC): ICD-10-CM

## 2021-01-01 DIAGNOSIS — J44.9 COPD (CHRONIC OBSTRUCTIVE PULMONARY DISEASE) (HCC): Primary | ICD-10-CM

## 2021-01-01 DIAGNOSIS — R06.4 LABORED BREATHING: ICD-10-CM

## 2021-01-01 DIAGNOSIS — Z51.5 HOSPICE CARE: ICD-10-CM

## 2021-01-01 LAB
ALBUMIN SERPL-MCNC: 2.5 G/DL (ref 3.5–5)
ALBUMIN SERPL-MCNC: 2.8 G/DL (ref 3.5–5)
ALBUMIN SERPL-MCNC: 3.1 G/DL (ref 3.5–5)
ALBUMIN SERPL-MCNC: 3.2 G/DL (ref 3.5–5)
ALBUMIN/GLOB SERPL: 0.8 {RATIO} (ref 1.1–2.2)
ALBUMIN/GLOB SERPL: 0.8 {RATIO} (ref 1.1–2.2)
ALBUMIN/GLOB SERPL: 0.9 {RATIO} (ref 1.1–2.2)
ALBUMIN/GLOB SERPL: 0.9 {RATIO} (ref 1.1–2.2)
ALP SERPL-CCNC: 101 U/L (ref 45–117)
ALP SERPL-CCNC: 137 U/L (ref 45–117)
ALP SERPL-CCNC: 146 U/L (ref 45–117)
ALP SERPL-CCNC: 87 U/L (ref 45–117)
ALT SERPL-CCNC: 23 U/L (ref 12–78)
ALT SERPL-CCNC: 23 U/L (ref 12–78)
ALT SERPL-CCNC: 26 U/L (ref 12–78)
ALT SERPL-CCNC: 28 U/L (ref 12–78)
AMORPH CRY URNS QL MICRO: ABNORMAL
ANION GAP SERPL CALC-SCNC: 10 MMOL/L (ref 5–15)
ANION GAP SERPL CALC-SCNC: 3 MMOL/L (ref 5–15)
ANION GAP SERPL CALC-SCNC: 5 MMOL/L (ref 5–15)
ANION GAP SERPL CALC-SCNC: 6 MMOL/L (ref 5–15)
ANION GAP SERPL CALC-SCNC: 6 MMOL/L (ref 5–15)
ANION GAP SERPL CALC-SCNC: 9 MMOL/L (ref 5–15)
APPEARANCE FLD: CLEAR
APPEARANCE UR: ABNORMAL
ARTERIAL PATENCY WRIST A: ABNORMAL
ARTERIAL PATENCY WRIST A: YES
ARTERIAL PATENCY WRIST A: YES
AST SERPL-CCNC: 20 U/L (ref 15–37)
AST SERPL-CCNC: 22 U/L (ref 15–37)
AST SERPL-CCNC: 29 U/L (ref 15–37)
AST SERPL-CCNC: 48 U/L (ref 15–37)
ATRIAL RATE: 90 BPM
BACTERIA URNS QL MICRO: NEGATIVE /HPF
BASE DEFICIT BLDA-SCNC: 1 MMOL/L
BASOPHILS # BLD: 0 K/UL (ref 0–0.1)
BASOPHILS # BLD: 0.1 K/UL (ref 0–0.1)
BASOPHILS NFR BLD: 0 % (ref 0–1)
BASOPHILS NFR BLD: 1 % (ref 0–1)
BDY SITE: ABNORMAL
BILIRUB SERPL-MCNC: 0.3 MG/DL (ref 0.2–1)
BILIRUB SERPL-MCNC: 0.4 MG/DL (ref 0.2–1)
BILIRUB SERPL-MCNC: 0.6 MG/DL (ref 0.2–1)
BILIRUB SERPL-MCNC: 0.7 MG/DL (ref 0.2–1)
BILIRUB UR QL: NEGATIVE
BNP SERPL-MCNC: 2030 PG/ML
BODY FLD TYPE: NORMAL
BREATHS.SPONTANEOUS ON VENT: 20
BUN SERPL-MCNC: 44 MG/DL (ref 6–20)
BUN SERPL-MCNC: 45 MG/DL (ref 6–20)
BUN SERPL-MCNC: 57 MG/DL (ref 6–20)
BUN SERPL-MCNC: 69 MG/DL (ref 6–20)
BUN SERPL-MCNC: 77 MG/DL (ref 6–20)
BUN SERPL-MCNC: 80 MG/DL (ref 6–20)
BUN/CREAT SERPL: 16 (ref 12–20)
BUN/CREAT SERPL: 16 (ref 12–20)
BUN/CREAT SERPL: 17 (ref 12–20)
BUN/CREAT SERPL: 19 (ref 12–20)
CA-I BLD-SCNC: 1.03 MMOL/L (ref 1.12–1.32)
CA-I BLD-SCNC: 1.05 MMOL/L (ref 1.12–1.32)
CALCIUM SERPL-MCNC: 7.2 MG/DL (ref 8.5–10.1)
CALCIUM SERPL-MCNC: 7.4 MG/DL (ref 8.5–10.1)
CALCIUM SERPL-MCNC: 7.7 MG/DL (ref 8.5–10.1)
CALCIUM SERPL-MCNC: 7.9 MG/DL (ref 8.5–10.1)
CALCIUM SERPL-MCNC: 8.8 MG/DL (ref 8.5–10.1)
CALCIUM SERPL-MCNC: 8.9 MG/DL (ref 8.5–10.1)
CALCULATED P AXIS, ECG09: 69 DEGREES
CALCULATED R AXIS, ECG10: 21 DEGREES
CALCULATED T AXIS, ECG11: 21 DEGREES
CHLORIDE SERPL-SCNC: 90 MMOL/L (ref 97–108)
CHLORIDE SERPL-SCNC: 91 MMOL/L (ref 97–108)
CHLORIDE SERPL-SCNC: 92 MMOL/L (ref 97–108)
CHLORIDE SERPL-SCNC: 93 MMOL/L (ref 97–108)
CHLORIDE SERPL-SCNC: 94 MMOL/L (ref 97–108)
CHLORIDE SERPL-SCNC: 95 MMOL/L (ref 97–108)
CHOLEST FLD-MCNC: <50 MG/DL
CK SERPL-CCNC: 209 U/L (ref 26–192)
CO2 SERPL-SCNC: 27 MMOL/L (ref 21–32)
CO2 SERPL-SCNC: 29 MMOL/L (ref 21–32)
CO2 SERPL-SCNC: 29 MMOL/L (ref 21–32)
CO2 SERPL-SCNC: 30 MMOL/L (ref 21–32)
CO2 SERPL-SCNC: 32 MMOL/L (ref 21–32)
CO2 SERPL-SCNC: 33 MMOL/L (ref 21–32)
COLOR FLD: ABNORMAL
COLOR UR: ABNORMAL
COMMENT, HOLDF: NORMAL
COMMENT, HOLDF: NORMAL
CREAT FLD-MCNC: 4.54 MG/DL
CREAT SERPL-MCNC: 2.26 MG/DL (ref 0.55–1.02)
CREAT SERPL-MCNC: 2.42 MG/DL (ref 0.55–1.02)
CREAT SERPL-MCNC: 3.08 MG/DL (ref 0.55–1.02)
CREAT SERPL-MCNC: 4.11 MG/DL (ref 0.55–1.02)
CREAT SERPL-MCNC: 4.81 MG/DL (ref 0.55–1.02)
CREAT SERPL-MCNC: 5.08 MG/DL (ref 0.55–1.02)
CREAT UR-MCNC: 213 MG/DL
CREAT UR-MCNC: 216 MG/DL
D DIMER PPP FEU-MCNC: 1.32 MG/L FEU (ref 0–0.65)
DIAGNOSIS, 93000: NORMAL
DIFFERENTIAL METHOD BLD: ABNORMAL
ECHO AO ROOT DIAM: 2.62 CM
ECHO AV AREA PEAK VELOCITY: 1.61 CM2
ECHO AV AREA VTI: 1.81 CM2
ECHO AV AREA/BSA PEAK VELOCITY: 0.7 CM2/M2
ECHO AV AREA/BSA VTI: 0.8 CM2/M2
ECHO AV MEAN GRADIENT: 8.8 MMHG
ECHO AV PEAK GRADIENT: 16.89 MMHG
ECHO AV PEAK VELOCITY: 205.46 CM/S
ECHO AV VTI: 36.43 CM
ECHO LA AREA 4C: 20.85 CM2
ECHO LA MAJOR AXIS: 4.31 CM
ECHO LA MINOR AXIS: 1.97 CM
ECHO LA VOL 2C: 60.81 ML (ref 22–52)
ECHO LA VOL 4C: 53.82 ML (ref 22–52)
ECHO LA VOL BP: 59.61 ML (ref 22–52)
ECHO LA VOL/BSA BIPLANE: 27.22 ML/M2 (ref 16–28)
ECHO LA VOLUME INDEX A2C: 27.76 ML/M2 (ref 16–28)
ECHO LA VOLUME INDEX A4C: 24.57 ML/M2 (ref 16–28)
ECHO LV INTERNAL DIMENSION DIASTOLIC: 4.72 CM (ref 3.9–5.3)
ECHO LV INTERNAL DIMENSION SYSTOLIC: 3.09 CM
ECHO LV IVSD: 0.75 CM (ref 0.6–0.9)
ECHO LV MASS 2D: 145.8 G (ref 67–162)
ECHO LV MASS INDEX 2D: 66.6 G/M2 (ref 43–95)
ECHO LV POSTERIOR WALL DIASTOLIC: 1.07 CM (ref 0.6–0.9)
ECHO LVOT DIAM: 1.88 CM
ECHO LVOT PEAK GRADIENT: 5.74 MMHG
ECHO LVOT PEAK VELOCITY: 119.8 CM/S
ECHO LVOT SV: 66 ML
ECHO LVOT VTI: 23.86 CM
ECHO MV A VELOCITY: 101.5 CM/S
ECHO MV AREA PHT: 4.77 CM2
ECHO MV E DECELERATION TIME (DT): 158.94 MS
ECHO MV E VELOCITY: 89.39 CM/S
ECHO MV E/A RATIO: 0.88
ECHO MV PRESSURE HALF TIME (PHT): 46.09 MS
ECHO PV PEAK INSTANTANEOUS GRADIENT SYSTOLIC: 5.58 MMHG
ECHO RV INTERNAL DIMENSION: 4.62 CM
ECHO RV TAPSE: 1.67 CM (ref 1.5–2)
ECHO TV REGURGITANT MAX VELOCITY: 338.31 CM/S
ECHO TV REGURGITANT PEAK GRADIENT: 45.78 MMHG
EOSINOPHIL # BLD: 0 K/UL (ref 0–0.4)
EOSINOPHIL NFR BLD: 0 % (ref 0–7)
EPITH CASTS URNS QL MICRO: ABNORMAL /LPF
ERYTHROCYTE [DISTWIDTH] IN BLOOD BY AUTOMATED COUNT: 15.9 % (ref 11.5–14.5)
ERYTHROCYTE [DISTWIDTH] IN BLOOD BY AUTOMATED COUNT: 16.1 % (ref 11.5–14.5)
ERYTHROCYTE [DISTWIDTH] IN BLOOD BY AUTOMATED COUNT: 16.3 % (ref 11.5–14.5)
ERYTHROCYTE [DISTWIDTH] IN BLOOD BY AUTOMATED COUNT: 16.8 % (ref 11.5–14.5)
ERYTHROCYTE [DISTWIDTH] IN BLOOD BY AUTOMATED COUNT: 17.2 % (ref 11.5–14.5)
ERYTHROCYTE [DISTWIDTH] IN BLOOD BY AUTOMATED COUNT: 17.8 % (ref 11.5–14.5)
EST. AVERAGE GLUCOSE BLD GHB EST-MCNC: 163 MG/DL
GAS FLOW.O2 O2 DELIVERY SYS: ABNORMAL L/MIN
GAS FLOW.O2 O2 DELIVERY SYS: ABNORMAL L/MIN
GAS FLOW.O2 SETTING OXYMISER: 4 L/MIN
GAS FLOW.O2 SETTING OXYMISER: 6 L/M
GLOBULIN SER CALC-MCNC: 2.9 G/DL (ref 2–4)
GLOBULIN SER CALC-MCNC: 3 G/DL (ref 2–4)
GLOBULIN SER CALC-MCNC: 3.8 G/DL (ref 2–4)
GLOBULIN SER CALC-MCNC: 4 G/DL (ref 2–4)
GLUCOSE BLD STRIP.AUTO-MCNC: 147 MG/DL (ref 65–100)
GLUCOSE BLD STRIP.AUTO-MCNC: 162 MG/DL (ref 65–100)
GLUCOSE BLD STRIP.AUTO-MCNC: 197 MG/DL (ref 65–100)
GLUCOSE BLD STRIP.AUTO-MCNC: 213 MG/DL (ref 65–100)
GLUCOSE BLD STRIP.AUTO-MCNC: 216 MG/DL (ref 65–100)
GLUCOSE BLD STRIP.AUTO-MCNC: 222 MG/DL (ref 65–100)
GLUCOSE BLD STRIP.AUTO-MCNC: 222 MG/DL (ref 65–100)
GLUCOSE BLD STRIP.AUTO-MCNC: 236 MG/DL (ref 65–100)
GLUCOSE BLD STRIP.AUTO-MCNC: 251 MG/DL (ref 65–100)
GLUCOSE BLD STRIP.AUTO-MCNC: 277 MG/DL (ref 65–100)
GLUCOSE BLD STRIP.AUTO-MCNC: 283 MG/DL (ref 65–100)
GLUCOSE BLD STRIP.AUTO-MCNC: 283 MG/DL (ref 65–100)
GLUCOSE BLD STRIP.AUTO-MCNC: 300 MG/DL (ref 65–100)
GLUCOSE BLD STRIP.AUTO-MCNC: 319 MG/DL (ref 65–100)
GLUCOSE FLD-MCNC: 182 MG/DL
GLUCOSE SERPL-MCNC: 198 MG/DL (ref 65–100)
GLUCOSE SERPL-MCNC: 205 MG/DL (ref 65–100)
GLUCOSE SERPL-MCNC: 216 MG/DL (ref 65–100)
GLUCOSE SERPL-MCNC: 226 MG/DL (ref 65–100)
GLUCOSE SERPL-MCNC: 231 MG/DL (ref 65–100)
GLUCOSE SERPL-MCNC: 241 MG/DL (ref 65–100)
GLUCOSE UR STRIP.AUTO-MCNC: 250 MG/DL
HBA1C MFR BLD: 7.3 % (ref 4–5.6)
HCO3 BLDA-SCNC: 31 MMOL/L (ref 22–26)
HCT VFR BLD AUTO: 38 % (ref 35–47)
HCT VFR BLD AUTO: 40.1 % (ref 35–47)
HCT VFR BLD AUTO: 42.3 % (ref 35–47)
HCT VFR BLD AUTO: 44.2 % (ref 35–47)
HCT VFR BLD AUTO: 49.5 % (ref 35–47)
HCT VFR BLD AUTO: 49.9 % (ref 35–47)
HGB BLD-MCNC: 10.8 G/DL (ref 11.5–16)
HGB BLD-MCNC: 11.1 G/DL (ref 11.5–16)
HGB BLD-MCNC: 11.9 G/DL (ref 11.5–16)
HGB BLD-MCNC: 12.3 G/DL (ref 11.5–16)
HGB BLD-MCNC: 14.1 G/DL (ref 11.5–16)
HGB BLD-MCNC: 14.2 G/DL (ref 11.5–16)
HGB UR QL STRIP: ABNORMAL
IMM GRANULOCYTES # BLD AUTO: 0 K/UL
IMM GRANULOCYTES # BLD AUTO: 0.1 K/UL (ref 0–0.04)
IMM GRANULOCYTES # BLD AUTO: 0.1 K/UL (ref 0–0.04)
IMM GRANULOCYTES # BLD AUTO: 0.3 K/UL (ref 0–0.04)
IMM GRANULOCYTES NFR BLD AUTO: 0 %
IMM GRANULOCYTES NFR BLD AUTO: 1 % (ref 0–0.5)
IMM GRANULOCYTES NFR BLD AUTO: 1 % (ref 0–0.5)
IMM GRANULOCYTES NFR BLD AUTO: 3 % (ref 0–0.5)
IPAP/PIP, IPAPIP: 6
KETONES UR QL STRIP.AUTO: NEGATIVE MG/DL
LDH FLD L TO P-CCNC: 89 U/L
LEUKOCYTE ESTERASE UR QL STRIP.AUTO: NEGATIVE
LYMPHOCYTES # BLD: 0.3 K/UL (ref 0.8–3.5)
LYMPHOCYTES # BLD: 0.3 K/UL (ref 0.8–3.5)
LYMPHOCYTES # BLD: 0.4 K/UL (ref 0.8–3.5)
LYMPHOCYTES # BLD: 0.4 K/UL (ref 0.8–3.5)
LYMPHOCYTES NFR BLD: 3 % (ref 12–49)
LYMPHOCYTES NFR BLD: 3 % (ref 12–49)
LYMPHOCYTES NFR BLD: 5 % (ref 12–49)
LYMPHOCYTES NFR BLD: 5 % (ref 12–49)
LYMPHOCYTES NFR FLD: 3 %
MAGNESIUM SERPL-MCNC: 2.2 MG/DL (ref 1.6–2.4)
MAGNESIUM SERPL-MCNC: 2.2 MG/DL (ref 1.6–2.4)
MAGNESIUM SERPL-MCNC: 2.3 MG/DL (ref 1.6–2.4)
MCH RBC QN AUTO: 30.8 PG (ref 26–34)
MCH RBC QN AUTO: 30.9 PG (ref 26–34)
MCH RBC QN AUTO: 31.1 PG (ref 26–34)
MCH RBC QN AUTO: 31.3 PG (ref 26–34)
MCH RBC QN AUTO: 31.5 PG (ref 26–34)
MCH RBC QN AUTO: 31.6 PG (ref 26–34)
MCHC RBC AUTO-ENTMCNC: 27.7 G/DL (ref 30–36.5)
MCHC RBC AUTO-ENTMCNC: 27.8 G/DL (ref 30–36.5)
MCHC RBC AUTO-ENTMCNC: 28.1 G/DL (ref 30–36.5)
MCHC RBC AUTO-ENTMCNC: 28.4 G/DL (ref 30–36.5)
MCHC RBC AUTO-ENTMCNC: 28.5 G/DL (ref 30–36.5)
MCHC RBC AUTO-ENTMCNC: 28.5 G/DL (ref 30–36.5)
MCV RBC AUTO: 108.9 FL (ref 80–99)
MCV RBC AUTO: 109.2 FL (ref 80–99)
MCV RBC AUTO: 111 FL (ref 80–99)
MCV RBC AUTO: 111.4 FL (ref 80–99)
MCV RBC AUTO: 111.9 FL (ref 80–99)
MCV RBC AUTO: 112.5 FL (ref 80–99)
METAMYELOCYTES NFR BLD MANUAL: 2 %
MONOCYTES # BLD: 0.1 K/UL (ref 0–1)
MONOCYTES # BLD: 0.2 K/UL (ref 0–1)
MONOCYTES # BLD: 0.2 K/UL (ref 0–1)
MONOCYTES # BLD: 0.4 K/UL (ref 0–1)
MONOCYTES NFR BLD: 1 % (ref 5–13)
MONOCYTES NFR BLD: 2 % (ref 5–13)
MONOCYTES NFR BLD: 3 % (ref 5–13)
MONOCYTES NFR BLD: 5 % (ref 5–13)
MONOS+MACROS NFR FLD: 76 %
MYELOCYTES NFR BLD MANUAL: 2 %
NEUTROPHILS NFR FLD: 21 %
NEUTS BAND NFR BLD MANUAL: 2 % (ref 0–6)
NEUTS SEG # BLD: 6.6 K/UL (ref 1.8–8)
NEUTS SEG # BLD: 6.9 K/UL (ref 1.8–8)
NEUTS SEG # BLD: 8 K/UL (ref 1.8–8)
NEUTS SEG # BLD: 8.9 K/UL (ref 1.8–8)
NEUTS SEG NFR BLD: 84 % (ref 32–75)
NEUTS SEG NFR BLD: 91 % (ref 32–75)
NEUTS SEG NFR BLD: 92 % (ref 32–75)
NEUTS SEG NFR BLD: 94 % (ref 32–75)
NITRITE UR QL STRIP.AUTO: NEGATIVE
NRBC # BLD: 0.09 K/UL (ref 0–0.01)
NRBC # BLD: 0.09 K/UL (ref 0–0.01)
NRBC # BLD: 0.13 K/UL (ref 0–0.01)
NRBC # BLD: 0.13 K/UL (ref 0–0.01)
NRBC # BLD: 0.14 K/UL (ref 0–0.01)
NRBC # BLD: 0.16 K/UL (ref 0–0.01)
NRBC BLD-RTO: 1 PER 100 WBC
NRBC BLD-RTO: 1.2 PER 100 WBC
NRBC BLD-RTO: 1.2 PER 100 WBC
NRBC BLD-RTO: 1.3 PER 100 WBC
NRBC BLD-RTO: 1.6 PER 100 WBC
NRBC BLD-RTO: 1.8 PER 100 WBC
NUC CELL # FLD: 256 /CU MM
O2/TOTAL GAS SETTING VFR VENT: 50 %
P-R INTERVAL, ECG05: 134 MS
PCO2 BLD: >90 MMHG (ref 35–45)
PCO2 BLD: >90 MMHG (ref 35–45)
PCO2 BLDA: 89 MMHG (ref 35–45)
PCO2 BLDV: >95 MMHG (ref 41–51)
PCO2 BLDV: >95 MMHG (ref 41–51)
PEEP MAX SETTING VENT: 20 CM[H2O]
PEEP RESPIRATORY: 5 CMH2O
PH BLD: 7.13 [PH] (ref 7.35–7.45)
PH BLD: 7.15 [PH] (ref 7.35–7.45)
PH BLDA: 7.16 [PH] (ref 7.35–7.45)
PH BLDV: 7.11 [PH] (ref 7.32–7.42)
PH BLDV: 7.11 [PH] (ref 7.32–7.42)
PH FLD: 7.2 [PH]
PH UR STRIP: 5 [PH] (ref 5–8)
PHOSPHATE SERPL-MCNC: 6.8 MG/DL (ref 2.6–4.7)
PHOSPHATE SERPL-MCNC: 8.3 MG/DL (ref 2.6–4.7)
PHOSPHATE SERPL-MCNC: 8.8 MG/DL (ref 2.6–4.7)
PHOSPHATE SERPL-MCNC: 8.9 MG/DL (ref 2.6–4.7)
PHOSPHATE SERPL-MCNC: 9 MG/DL (ref 2.6–4.7)
PIP ISTAT,IPIP: 22
PLATELET # BLD AUTO: 144 K/UL (ref 150–400)
PLATELET # BLD AUTO: 145 K/UL (ref 150–400)
PLATELET # BLD AUTO: 154 K/UL (ref 150–400)
PLATELET # BLD AUTO: 180 K/UL (ref 150–400)
PLATELET # BLD AUTO: 192 K/UL (ref 150–400)
PLATELET # BLD AUTO: 197 K/UL (ref 150–400)
PMV BLD AUTO: 10 FL (ref 8.9–12.9)
PMV BLD AUTO: 10.3 FL (ref 8.9–12.9)
PMV BLD AUTO: 10.4 FL (ref 8.9–12.9)
PMV BLD AUTO: 9.7 FL (ref 8.9–12.9)
PMV BLD AUTO: 9.8 FL (ref 8.9–12.9)
PMV BLD AUTO: 9.9 FL (ref 8.9–12.9)
PO2 BLD: 124 MMHG (ref 80–100)
PO2 BLD: 53 MMHG (ref 80–100)
PO2 BLDA: 79 MMHG (ref 80–100)
PO2 BLDV: 51 MMHG (ref 25–40)
PO2 BLDV: 61 MMHG (ref 25–40)
POTASSIUM SERPL-SCNC: 4.8 MMOL/L (ref 3.5–5.1)
POTASSIUM SERPL-SCNC: 4.8 MMOL/L (ref 3.5–5.1)
POTASSIUM SERPL-SCNC: 5.2 MMOL/L (ref 3.5–5.1)
POTASSIUM SERPL-SCNC: 5.4 MMOL/L (ref 3.5–5.1)
POTASSIUM SERPL-SCNC: 5.9 MMOL/L (ref 3.5–5.1)
POTASSIUM SERPL-SCNC: 6.6 MMOL/L (ref 3.5–5.1)
PROT FLD-MCNC: 2.1 G/DL
PROT SERPL-MCNC: 5.4 G/DL (ref 6.4–8.2)
PROT SERPL-MCNC: 5.8 G/DL (ref 6.4–8.2)
PROT SERPL-MCNC: 7 G/DL (ref 6.4–8.2)
PROT SERPL-MCNC: 7.1 G/DL (ref 6.4–8.2)
PROT UR STRIP-MCNC: 300 MG/DL
PROT UR-MCNC: 337 MG/DL (ref 0–11.9)
PROT/CREAT UR-RTO: 1.6
Q-T INTERVAL, ECG07: 358 MS
QRS DURATION, ECG06: 84 MS
QTC CALCULATION (BEZET), ECG08: 437 MS
RBC # BLD AUTO: 3.49 M/UL (ref 3.8–5.2)
RBC # BLD AUTO: 3.6 M/UL (ref 3.8–5.2)
RBC # BLD AUTO: 3.78 M/UL (ref 3.8–5.2)
RBC # BLD AUTO: 3.93 M/UL (ref 3.8–5.2)
RBC # BLD AUTO: 4.46 M/UL (ref 3.8–5.2)
RBC # BLD AUTO: 4.57 M/UL (ref 3.8–5.2)
RBC # FLD: >100 /CU MM
RBC #/AREA URNS HPF: ABNORMAL /HPF (ref 0–5)
RBC MORPH BLD: ABNORMAL
SAMPLES BEING HELD,HOLD: NORMAL
SAMPLES BEING HELD,HOLD: NORMAL
SAO2 % BLD: 91 % (ref 92–97)
SAO2% DEVICE SAO2% SENSOR NAME: ABNORMAL
SERVICE CMNT-IMP: ABNORMAL
SODIUM SERPL-SCNC: 127 MMOL/L (ref 136–145)
SODIUM SERPL-SCNC: 127 MMOL/L (ref 136–145)
SODIUM SERPL-SCNC: 129 MMOL/L (ref 136–145)
SODIUM SERPL-SCNC: 130 MMOL/L (ref 136–145)
SODIUM SERPL-SCNC: 130 MMOL/L (ref 136–145)
SODIUM SERPL-SCNC: 131 MMOL/L (ref 136–145)
SODIUM UR-SCNC: 6 MMOL/L
SP GR UR REFRACTOMETRY: 1.02 (ref 1–1.03)
SPECIMEN SITE: ABNORMAL
SPECIMEN SOURCE FLD: ABNORMAL
SPECIMEN SOURCE FLD: NORMAL
SPECIMEN TYPE: ABNORMAL
SPECIMEN TYPE: ABNORMAL
TOTAL RESP. RATE, ITRR: 16
TROPONIN I SERPL-MCNC: <0.05 NG/ML
TROPONIN I SERPL-MCNC: <0.05 NG/ML
TSH SERPL DL<=0.05 MIU/L-ACNC: 3.56 UIU/ML (ref 0.36–3.74)
UA: UC IF INDICATED,UAUC: ABNORMAL
UROBILINOGEN UR QL STRIP.AUTO: 1 EU/DL (ref 0.2–1)
VENTRICULAR RATE, ECG03: 90 BPM
WBC # BLD AUTO: 11.2 K/UL (ref 3.6–11)
WBC # BLD AUTO: 7.3 K/UL (ref 3.6–11)
WBC # BLD AUTO: 8 K/UL (ref 3.6–11)
WBC # BLD AUTO: 8.6 K/UL (ref 3.6–11)
WBC # BLD AUTO: 8.7 K/UL (ref 3.6–11)
WBC # BLD AUTO: 9.7 K/UL (ref 3.6–11)
WBC URNS QL MICRO: ABNORMAL /HPF (ref 0–4)

## 2021-01-01 PROCEDURE — 77030038269 HC DRN EXT URIN PURWCK BARD -A

## 2021-01-01 PROCEDURE — 74011250637 HC RX REV CODE- 250/637: Performed by: INTERNAL MEDICINE

## 2021-01-01 PROCEDURE — 36415 COLL VENOUS BLD VENIPUNCTURE: CPT

## 2021-01-01 PROCEDURE — 74011000250 HC RX REV CODE- 250: Performed by: FAMILY MEDICINE

## 2021-01-01 PROCEDURE — 65270000029 HC RM PRIVATE

## 2021-01-01 PROCEDURE — 36600 WITHDRAWAL OF ARTERIAL BLOOD: CPT

## 2021-01-01 PROCEDURE — 83036 HEMOGLOBIN GLYCOSYLATED A1C: CPT

## 2021-01-01 PROCEDURE — 74011250636 HC RX REV CODE- 250/636: Performed by: INTERNAL MEDICINE

## 2021-01-01 PROCEDURE — 74011250637 HC RX REV CODE- 250/637: Performed by: FAMILY MEDICINE

## 2021-01-01 PROCEDURE — 84100 ASSAY OF PHOSPHORUS: CPT

## 2021-01-01 PROCEDURE — 74011250636 HC RX REV CODE- 250/636: Performed by: FAMILY MEDICINE

## 2021-01-01 PROCEDURE — 84484 ASSAY OF TROPONIN QUANT: CPT

## 2021-01-01 PROCEDURE — P9047 ALBUMIN (HUMAN), 25%, 50ML: HCPCS | Performed by: INTERNAL MEDICINE

## 2021-01-01 PROCEDURE — 71045 X-RAY EXAM CHEST 1 VIEW: CPT

## 2021-01-01 PROCEDURE — 99223 1ST HOSP IP/OBS HIGH 75: CPT | Performed by: FAMILY MEDICINE

## 2021-01-01 PROCEDURE — 77030029684 HC NEB SM VOL KT MONA -A

## 2021-01-01 PROCEDURE — 65660000000 HC RM CCU STEPDOWN

## 2021-01-01 PROCEDURE — 74011250637 HC RX REV CODE- 250/637: Performed by: NURSE PRACTITIONER

## 2021-01-01 PROCEDURE — 80048 BASIC METABOLIC PNL TOTAL CA: CPT

## 2021-01-01 PROCEDURE — 2709999900 HC NON-CHARGEABLE SUPPLY

## 2021-01-01 PROCEDURE — 96365 THER/PROPH/DIAG IV INF INIT: CPT

## 2021-01-01 PROCEDURE — 74011000250 HC RX REV CODE- 250: Performed by: PHYSICIAN ASSISTANT

## 2021-01-01 PROCEDURE — C1729 CATH, DRAINAGE: HCPCS

## 2021-01-01 PROCEDURE — 82803 BLOOD GASES ANY COMBINATION: CPT

## 2021-01-01 PROCEDURE — 82945 GLUCOSE OTHER FLUID: CPT

## 2021-01-01 PROCEDURE — 74011636637 HC RX REV CODE- 636/637: Performed by: FAMILY MEDICINE

## 2021-01-01 PROCEDURE — 94760 N-INVAS EAR/PLS OXIMETRY 1: CPT

## 2021-01-01 PROCEDURE — 82962 GLUCOSE BLOOD TEST: CPT

## 2021-01-01 PROCEDURE — 94640 AIRWAY INHALATION TREATMENT: CPT

## 2021-01-01 PROCEDURE — 0656 HSPC GENERAL INPATIENT

## 2021-01-01 PROCEDURE — 83735 ASSAY OF MAGNESIUM: CPT

## 2021-01-01 PROCEDURE — 76770 US EXAM ABDO BACK WALL COMP: CPT

## 2021-01-01 PROCEDURE — 84156 ASSAY OF PROTEIN URINE: CPT

## 2021-01-01 PROCEDURE — 99223 1ST HOSP IP/OBS HIGH 75: CPT | Performed by: INTERNAL MEDICINE

## 2021-01-01 PROCEDURE — 82550 ASSAY OF CK (CPK): CPT

## 2021-01-01 PROCEDURE — 77030013140 HC MSK NEB VYRM -A

## 2021-01-01 PROCEDURE — 3336500001 HSPC ELECTION

## 2021-01-01 PROCEDURE — 93970 EXTREMITY STUDY: CPT

## 2021-01-01 PROCEDURE — 71250 CT THORAX DX C-: CPT

## 2021-01-01 PROCEDURE — 65660000001 HC RM ICU INTERMED STEPDOWN

## 2021-01-01 PROCEDURE — HOSPICE MEDICATION HC HH HOSPICE MEDICATION

## 2021-01-01 PROCEDURE — 99284 EMERGENCY DEPT VISIT MOD MDM: CPT

## 2021-01-01 PROCEDURE — 83880 ASSAY OF NATRIURETIC PEPTIDE: CPT

## 2021-01-01 PROCEDURE — 51798 US URINE CAPACITY MEASURE: CPT

## 2021-01-01 PROCEDURE — 83615 LACTATE (LD) (LDH) ENZYME: CPT

## 2021-01-01 PROCEDURE — 81001 URINALYSIS AUTO W/SCOPE: CPT

## 2021-01-01 PROCEDURE — 89050 BODY FLUID CELL COUNT: CPT

## 2021-01-01 PROCEDURE — 87015 SPECIMEN INFECT AGNT CONCNTJ: CPT

## 2021-01-01 PROCEDURE — 74011636637 HC RX REV CODE- 636/637: Performed by: INTERNAL MEDICINE

## 2021-01-01 PROCEDURE — 94660 CPAP INITIATION&MGMT: CPT

## 2021-01-01 PROCEDURE — 87116 MYCOBACTERIA CULTURE: CPT

## 2021-01-01 PROCEDURE — 74011000250 HC RX REV CODE- 250: Performed by: INTERNAL MEDICINE

## 2021-01-01 PROCEDURE — 74011000258 HC RX REV CODE- 258: Performed by: INTERNAL MEDICINE

## 2021-01-01 PROCEDURE — 85025 COMPLETE CBC W/AUTO DIFF WBC: CPT

## 2021-01-01 PROCEDURE — 77010033678 HC OXYGEN DAILY

## 2021-01-01 PROCEDURE — 96375 TX/PRO/DX INJ NEW DRUG ADDON: CPT

## 2021-01-01 PROCEDURE — 93005 ELECTROCARDIOGRAM TRACING: CPT

## 2021-01-01 PROCEDURE — 80053 COMPREHEN METABOLIC PANEL: CPT

## 2021-01-01 PROCEDURE — 85027 COMPLETE CBC AUTOMATED: CPT

## 2021-01-01 PROCEDURE — 84157 ASSAY OF PROTEIN OTHER: CPT

## 2021-01-01 PROCEDURE — 77030013038 HC MSK CPAP FISP -A

## 2021-01-01 PROCEDURE — 99233 SBSQ HOSP IP/OBS HIGH 50: CPT | Performed by: FAMILY MEDICINE

## 2021-01-01 PROCEDURE — 77030019905 HC CATH URETH INTMIT MDII -A

## 2021-01-01 PROCEDURE — 74011250636 HC RX REV CODE- 250/636: Performed by: NURSE PRACTITIONER

## 2021-01-01 PROCEDURE — 77030003666 HC NDL SPINAL BD -A

## 2021-01-01 PROCEDURE — 82570 ASSAY OF URINE CREATININE: CPT

## 2021-01-01 PROCEDURE — 87102 FUNGUS ISOLATION CULTURE: CPT

## 2021-01-01 PROCEDURE — 74011250636 HC RX REV CODE- 250/636: Performed by: EMERGENCY MEDICINE

## 2021-01-01 PROCEDURE — C8929 TTE W OR WO FOL WCON,DOPPLER: HCPCS

## 2021-01-01 PROCEDURE — 77030032034 HC SYS EVAC ASEPT DISP BBMI -B

## 2021-01-01 PROCEDURE — 83986 ASSAY PH BODY FLUID NOS: CPT

## 2021-01-01 PROCEDURE — 84311 SPECTROPHOTOMETRY: CPT

## 2021-01-01 PROCEDURE — 74176 CT ABD & PELVIS W/O CONTRAST: CPT

## 2021-01-01 PROCEDURE — 87205 SMEAR GRAM STAIN: CPT

## 2021-01-01 PROCEDURE — 88112 CYTOPATH CELL ENHANCE TECH: CPT

## 2021-01-01 PROCEDURE — 88305 TISSUE EXAM BY PATHOLOGIST: CPT

## 2021-01-01 PROCEDURE — P9045 ALBUMIN (HUMAN), 5%, 250 ML: HCPCS | Performed by: NURSE PRACTITIONER

## 2021-01-01 PROCEDURE — 5A09457 ASSISTANCE WITH RESPIRATORY VENTILATION, 24-96 CONSECUTIVE HOURS, CONTINUOUS POSITIVE AIRWAY PRESSURE: ICD-10-PCS | Performed by: FAMILY MEDICINE

## 2021-01-01 PROCEDURE — 84300 ASSAY OF URINE SODIUM: CPT

## 2021-01-01 PROCEDURE — 84443 ASSAY THYROID STIM HORMONE: CPT

## 2021-01-01 PROCEDURE — 32555 ASPIRATE PLEURA W/ IMAGING: CPT

## 2021-01-01 PROCEDURE — 85379 FIBRIN DEGRADATION QUANT: CPT

## 2021-01-01 RX ORDER — LORAZEPAM 2 MG/ML
1 CONCENTRATE ORAL
Status: COMPLETED | OUTPATIENT
Start: 2021-01-01 | End: 2021-01-01

## 2021-01-01 RX ORDER — HYDROMORPHONE HYDROCHLORIDE 1 MG/ML
1 INJECTION, SOLUTION INTRAMUSCULAR; INTRAVENOUS; SUBCUTANEOUS
Status: DISCONTINUED | OUTPATIENT
Start: 2021-01-01 | End: 2021-01-01

## 2021-01-01 RX ORDER — DEXTROSE 50 % IN WATER (D50W) INTRAVENOUS SYRINGE
12.5-25 AS NEEDED
Status: DISCONTINUED | OUTPATIENT
Start: 2021-01-01 | End: 2021-01-01 | Stop reason: HOSPADM

## 2021-01-01 RX ORDER — ACETAMINOPHEN 325 MG/1
650 TABLET ORAL
Status: DISCONTINUED | OUTPATIENT
Start: 2021-01-01 | End: 2021-01-01 | Stop reason: HOSPADM

## 2021-01-01 RX ORDER — SODIUM BICARBONATE 1 MEQ/ML
50 SYRINGE (ML) INTRAVENOUS ONCE
Status: COMPLETED | OUTPATIENT
Start: 2021-01-01 | End: 2021-01-01

## 2021-01-01 RX ORDER — DULOXETIN HYDROCHLORIDE 60 MG/1
60 CAPSULE, DELAYED RELEASE ORAL DAILY
Status: DISCONTINUED | OUTPATIENT
Start: 2021-01-01 | End: 2021-01-01 | Stop reason: HOSPADM

## 2021-01-01 RX ORDER — FUROSEMIDE 10 MG/ML
40 INJECTION INTRAMUSCULAR; INTRAVENOUS DAILY
Status: DISCONTINUED | OUTPATIENT
Start: 2021-01-01 | End: 2021-01-01 | Stop reason: HOSPADM

## 2021-01-01 RX ORDER — LORAZEPAM 2 MG/ML
0.5 CONCENTRATE ORAL
Status: DISCONTINUED | OUTPATIENT
Start: 2021-01-01 | End: 2021-01-01 | Stop reason: HOSPADM

## 2021-01-01 RX ORDER — MAGNESIUM SULFATE 100 %
4 CRYSTALS MISCELLANEOUS AS NEEDED
Status: DISCONTINUED | OUTPATIENT
Start: 2021-01-01 | End: 2021-01-01 | Stop reason: HOSPADM

## 2021-01-01 RX ORDER — INSULIN LISPRO 100 [IU]/ML
10 INJECTION, SOLUTION INTRAVENOUS; SUBCUTANEOUS ONCE
Status: DISCONTINUED | OUTPATIENT
Start: 2021-01-01 | End: 2021-01-01 | Stop reason: ALTCHOICE

## 2021-01-01 RX ORDER — ALBUMIN HUMAN 50 G/1000ML
25 SOLUTION INTRAVENOUS ONCE
Status: COMPLETED | OUTPATIENT
Start: 2021-01-01 | End: 2021-01-01

## 2021-01-01 RX ORDER — LOSARTAN POTASSIUM 50 MG/1
100 TABLET ORAL DAILY
Status: DISCONTINUED | OUTPATIENT
Start: 2021-01-01 | End: 2021-01-01 | Stop reason: HOSPADM

## 2021-01-01 RX ORDER — ALLOPURINOL 300 MG/1
300 TABLET ORAL DAILY
Status: DISCONTINUED | OUTPATIENT
Start: 2021-01-01 | End: 2021-01-01

## 2021-01-01 RX ORDER — DOXAZOSIN 2 MG/1
1 TABLET ORAL DAILY
Status: DISCONTINUED | OUTPATIENT
Start: 2021-01-01 | End: 2021-01-01 | Stop reason: HOSPADM

## 2021-01-01 RX ORDER — IPRATROPIUM BROMIDE AND ALBUTEROL SULFATE 2.5; .5 MG/3ML; MG/3ML
3 SOLUTION RESPIRATORY (INHALATION)
Status: DISCONTINUED | OUTPATIENT
Start: 2021-01-01 | End: 2021-01-01 | Stop reason: HOSPADM

## 2021-01-01 RX ORDER — HYDROMORPHONE HYDROCHLORIDE 1 MG/ML
0.5 INJECTION, SOLUTION INTRAMUSCULAR; INTRAVENOUS; SUBCUTANEOUS EVERY 4 HOURS
Status: DISCONTINUED | OUTPATIENT
Start: 2021-01-01 | End: 2021-01-01

## 2021-01-01 RX ORDER — ROSUVASTATIN CALCIUM 10 MG/1
5 TABLET, COATED ORAL EVERY OTHER DAY
Status: DISCONTINUED | OUTPATIENT
Start: 2021-01-01 | End: 2021-01-01 | Stop reason: HOSPADM

## 2021-01-01 RX ORDER — SODIUM POLYSTYRENE SULFONATE 15 G/60ML
30 SUSPENSION ORAL; RECTAL
Status: COMPLETED | OUTPATIENT
Start: 2021-01-01 | End: 2021-01-01

## 2021-01-01 RX ORDER — SODIUM CHLORIDE 0.9 % (FLUSH) 0.9 %
5-40 SYRINGE (ML) INJECTION AS NEEDED
Status: DISCONTINUED | OUTPATIENT
Start: 2021-01-01 | End: 2021-01-01 | Stop reason: HOSPADM

## 2021-01-01 RX ORDER — ASPIRIN 81 MG/1
81 TABLET ORAL DAILY
Status: DISCONTINUED | OUTPATIENT
Start: 2021-01-01 | End: 2021-01-01 | Stop reason: HOSPADM

## 2021-01-01 RX ORDER — HYDROMORPHONE HYDROCHLORIDE 5 MG/5ML
2 SOLUTION ORAL
Status: COMPLETED | OUTPATIENT
Start: 2021-01-01 | End: 2021-01-01

## 2021-01-01 RX ORDER — PREDNISONE 20 MG/1
40 TABLET ORAL
Status: DISCONTINUED | OUTPATIENT
Start: 2021-01-01 | End: 2021-01-01 | Stop reason: HOSPADM

## 2021-01-01 RX ORDER — HYOSCYAMINE SULFATE 0.12 MG/1
0.12 TABLET SUBLINGUAL
Status: DISCONTINUED | OUTPATIENT
Start: 2021-01-01 | End: 2021-01-01 | Stop reason: HOSPADM

## 2021-01-01 RX ORDER — INSULIN LISPRO 100 [IU]/ML
INJECTION, SOLUTION INTRAVENOUS; SUBCUTANEOUS
Status: DISCONTINUED | OUTPATIENT
Start: 2021-01-01 | End: 2021-01-01 | Stop reason: HOSPADM

## 2021-01-01 RX ORDER — IPRATROPIUM BROMIDE AND ALBUTEROL SULFATE 2.5; .5 MG/3ML; MG/3ML
3 SOLUTION RESPIRATORY (INHALATION)
Status: DISCONTINUED | OUTPATIENT
Start: 2021-01-01 | End: 2021-01-01

## 2021-01-01 RX ORDER — HYDROMORPHONE HYDROCHLORIDE 1 MG/ML
0.5 INJECTION, SOLUTION INTRAMUSCULAR; INTRAVENOUS; SUBCUTANEOUS
Status: COMPLETED | OUTPATIENT
Start: 2021-01-01 | End: 2021-01-01

## 2021-01-01 RX ORDER — AMMONIUM LACTATE 12 G/100G
CREAM TOPICAL AS NEEDED
Status: DISCONTINUED | OUTPATIENT
Start: 2021-01-01 | End: 2021-01-01 | Stop reason: HOSPADM

## 2021-01-01 RX ORDER — LORAZEPAM 2 MG/ML
1 INJECTION INTRAMUSCULAR
Status: COMPLETED | OUTPATIENT
Start: 2021-01-01 | End: 2021-01-01

## 2021-01-01 RX ORDER — FACIAL-BODY WIPES
10 EACH TOPICAL DAILY PRN
Status: DISCONTINUED | OUTPATIENT
Start: 2021-01-01 | End: 2021-01-01 | Stop reason: HOSPADM

## 2021-01-01 RX ORDER — HYDROMORPHONE HYDROCHLORIDE 1 MG/ML
0.5 INJECTION, SOLUTION INTRAMUSCULAR; INTRAVENOUS; SUBCUTANEOUS
Status: DISCONTINUED | OUTPATIENT
Start: 2021-01-01 | End: 2021-01-01

## 2021-01-01 RX ORDER — DEXTROSE 50 % IN WATER (D50W) INTRAVENOUS SYRINGE
25 ONCE
Status: DISCONTINUED | OUTPATIENT
Start: 2021-01-01 | End: 2021-01-01 | Stop reason: ALTCHOICE

## 2021-01-01 RX ORDER — PROMETHAZINE HYDROCHLORIDE 25 MG/1
12.5 TABLET ORAL
Status: DISCONTINUED | OUTPATIENT
Start: 2021-01-01 | End: 2021-01-01 | Stop reason: HOSPADM

## 2021-01-01 RX ORDER — MIDODRINE HYDROCHLORIDE 5 MG/1
5 TABLET ORAL ONCE
Status: COMPLETED | OUTPATIENT
Start: 2021-01-01 | End: 2021-01-01

## 2021-01-01 RX ORDER — SODIUM CHLORIDE 0.9 % (FLUSH) 0.9 %
5-40 SYRINGE (ML) INJECTION EVERY 8 HOURS
Status: DISCONTINUED | OUTPATIENT
Start: 2021-01-01 | End: 2021-01-01 | Stop reason: HOSPADM

## 2021-01-01 RX ORDER — ONDANSETRON 2 MG/ML
4 INJECTION INTRAMUSCULAR; INTRAVENOUS
Status: DISCONTINUED | OUTPATIENT
Start: 2021-01-01 | End: 2021-01-01 | Stop reason: HOSPADM

## 2021-01-01 RX ORDER — GLYCOPYRROLATE 0.2 MG/ML
0.2 INJECTION INTRAMUSCULAR; INTRAVENOUS EVERY 4 HOURS
Status: DISCONTINUED | OUTPATIENT
Start: 2021-01-01 | End: 2021-01-01

## 2021-01-01 RX ORDER — SODIUM POLYSTYRENE SULFONATE 15 G/60ML
15 SUSPENSION ORAL; RECTAL
Status: COMPLETED | OUTPATIENT
Start: 2021-01-01 | End: 2021-01-01

## 2021-01-01 RX ORDER — LORAZEPAM 2 MG/ML
1 INJECTION INTRAMUSCULAR
Status: DISCONTINUED | OUTPATIENT
Start: 2021-01-01 | End: 2021-01-01

## 2021-01-01 RX ORDER — HEPARIN SODIUM 5000 [USP'U]/ML
5000 INJECTION, SOLUTION INTRAVENOUS; SUBCUTANEOUS EVERY 8 HOURS
Status: DISCONTINUED | OUTPATIENT
Start: 2021-01-01 | End: 2021-01-01 | Stop reason: HOSPADM

## 2021-01-01 RX ORDER — CETIRIZINE HCL 10 MG
10 TABLET ORAL DAILY
Status: DISCONTINUED | OUTPATIENT
Start: 2021-01-01 | End: 2021-01-01 | Stop reason: HOSPADM

## 2021-01-01 RX ORDER — HYDROMORPHONE HYDROCHLORIDE 5 MG/5ML
1 SOLUTION ORAL
Status: DISCONTINUED | OUTPATIENT
Start: 2021-01-01 | End: 2021-01-01 | Stop reason: HOSPADM

## 2021-01-01 RX ORDER — ALLOPURINOL 100 MG/1
200 TABLET ORAL DAILY
Status: DISCONTINUED | OUTPATIENT
Start: 2021-01-01 | End: 2021-01-01 | Stop reason: HOSPADM

## 2021-01-01 RX ORDER — LEVOFLOXACIN 5 MG/ML
750 INJECTION, SOLUTION INTRAVENOUS ONCE
Status: COMPLETED | OUTPATIENT
Start: 2021-01-01 | End: 2021-01-01

## 2021-01-01 RX ORDER — ATENOLOL 25 MG/1
25 TABLET ORAL DAILY
Status: DISCONTINUED | OUTPATIENT
Start: 2021-01-01 | End: 2021-01-01 | Stop reason: HOSPADM

## 2021-01-01 RX ORDER — HYDROMORPHONE HYDROCHLORIDE 1 MG/ML
1 INJECTION, SOLUTION INTRAMUSCULAR; INTRAVENOUS; SUBCUTANEOUS EVERY 4 HOURS
Status: DISCONTINUED | OUTPATIENT
Start: 2021-01-01 | End: 2021-01-01

## 2021-01-01 RX ORDER — ACETAMINOPHEN 650 MG/1
650 SUPPOSITORY RECTAL
Status: DISCONTINUED | OUTPATIENT
Start: 2021-01-01 | End: 2021-01-01

## 2021-01-01 RX ORDER — LORAZEPAM 2 MG/ML
1 INJECTION INTRAMUSCULAR EVERY 4 HOURS
Status: DISCONTINUED | OUTPATIENT
Start: 2021-01-01 | End: 2021-01-01

## 2021-01-01 RX ORDER — DEXTROSE 50 % IN WATER (D50W) INTRAVENOUS SYRINGE
25 ONCE
Status: COMPLETED | OUTPATIENT
Start: 2021-01-01 | End: 2021-01-01

## 2021-01-01 RX ORDER — POLYETHYLENE GLYCOL 3350 17 G/17G
17 POWDER, FOR SOLUTION ORAL DAILY PRN
Status: DISCONTINUED | OUTPATIENT
Start: 2021-01-01 | End: 2021-01-01 | Stop reason: HOSPADM

## 2021-01-01 RX ORDER — ALPRAZOLAM 0.25 MG/1
0.25 TABLET ORAL ONCE
Status: COMPLETED | OUTPATIENT
Start: 2021-01-01 | End: 2021-01-01

## 2021-01-01 RX ORDER — ALBUMIN HUMAN 250 G/1000ML
12.5 SOLUTION INTRAVENOUS EVERY 6 HOURS
Status: DISCONTINUED | OUTPATIENT
Start: 2021-01-01 | End: 2021-01-01 | Stop reason: HOSPADM

## 2021-01-01 RX ORDER — LIDOCAINE HYDROCHLORIDE 10 MG/ML
10 INJECTION, SOLUTION EPIDURAL; INFILTRATION; INTRACAUDAL; PERINEURAL
Status: COMPLETED | OUTPATIENT
Start: 2021-01-01 | End: 2021-01-01

## 2021-01-01 RX ORDER — ACETAMINOPHEN 650 MG/1
650 SUPPOSITORY RECTAL
Status: DISCONTINUED | OUTPATIENT
Start: 2021-01-01 | End: 2021-01-01 | Stop reason: HOSPADM

## 2021-01-01 RX ORDER — LORAZEPAM 2 MG/ML
0.5 INJECTION INTRAMUSCULAR EVERY 4 HOURS
Status: DISCONTINUED | OUTPATIENT
Start: 2021-01-01 | End: 2021-01-01

## 2021-01-01 RX ORDER — AMLODIPINE BESYLATE 5 MG/1
5 TABLET ORAL DAILY
Status: DISCONTINUED | OUTPATIENT
Start: 2021-01-01 | End: 2021-01-01 | Stop reason: HOSPADM

## 2021-01-01 RX ORDER — LORAZEPAM 2 MG/ML
0.5 INJECTION INTRAMUSCULAR
Status: DISCONTINUED | OUTPATIENT
Start: 2021-01-01 | End: 2021-01-01

## 2021-01-01 RX ORDER — CLONIDINE HYDROCHLORIDE 0.2 MG/1
0.2 TABLET ORAL 2 TIMES DAILY
Status: DISCONTINUED | OUTPATIENT
Start: 2021-01-01 | End: 2021-01-01 | Stop reason: HOSPADM

## 2021-01-01 RX ADMIN — GLYCOPYRROLATE 0.2 MG: 0.2 INJECTION INTRAMUSCULAR; INTRAVENOUS at 11:39

## 2021-01-01 RX ADMIN — ALLOPURINOL 300 MG: 300 TABLET ORAL at 09:44

## 2021-01-01 RX ADMIN — MIDODRINE HYDROCHLORIDE 5 MG: 5 TABLET ORAL at 04:35

## 2021-01-01 RX ADMIN — IPRATROPIUM BROMIDE AND ALBUTEROL SULFATE 3 ML: .5; 3 SOLUTION RESPIRATORY (INHALATION) at 19:11

## 2021-01-01 RX ADMIN — HYDROMORPHONE HYDROCHLORIDE 1 MG: 5 LIQUID ORAL at 03:42

## 2021-01-01 RX ADMIN — HEPARIN SODIUM 5000 UNITS: 5000 INJECTION INTRAVENOUS; SUBCUTANEOUS at 01:05

## 2021-01-01 RX ADMIN — LIDOCAINE HYDROCHLORIDE 10 ML: 10 INJECTION, SOLUTION EPIDURAL; INFILTRATION; INTRACAUDAL; PERINEURAL at 15:00

## 2021-01-01 RX ADMIN — INSULIN LISPRO 2 UNITS: 100 INJECTION, SOLUTION INTRAVENOUS; SUBCUTANEOUS at 09:15

## 2021-01-01 RX ADMIN — DOXAZOSIN 1 MG: 1 TABLET ORAL at 08:44

## 2021-01-01 RX ADMIN — HYDROMORPHONE HYDROCHLORIDE 1 MG: 5 LIQUID ORAL at 07:54

## 2021-01-01 RX ADMIN — ALLOPURINOL 300 MG: 300 TABLET ORAL at 09:54

## 2021-01-01 RX ADMIN — ALBUMIN (HUMAN) 12.5 G: 0.25 INJECTION, SOLUTION INTRAVENOUS at 16:05

## 2021-01-01 RX ADMIN — GLYCOPYRROLATE 0.2 MG: 0.2 INJECTION INTRAMUSCULAR; INTRAVENOUS at 19:57

## 2021-01-01 RX ADMIN — CLONIDINE HYDROCHLORIDE 0.2 MG: 0.1 TABLET ORAL at 10:52

## 2021-01-01 RX ADMIN — INSULIN LISPRO 2 UNITS: 100 INJECTION, SOLUTION INTRAVENOUS; SUBCUTANEOUS at 21:24

## 2021-01-01 RX ADMIN — LORAZEPAM 1 MG: 2 INJECTION INTRAMUSCULAR; INTRAVENOUS at 13:24

## 2021-01-01 RX ADMIN — HEPARIN SODIUM 5000 UNITS: 5000 INJECTION INTRAVENOUS; SUBCUTANEOUS at 17:50

## 2021-01-01 RX ADMIN — SODIUM POLYSTYRENE SULFONATE 30 G: 15 SUSPENSION ORAL; RECTAL at 05:39

## 2021-01-01 RX ADMIN — LORAZEPAM 0.5 MG: 2 SOLUTION, CONCENTRATE ORAL at 07:53

## 2021-01-01 RX ADMIN — Medication 1 CAPSULE: at 09:54

## 2021-01-01 RX ADMIN — INSULIN LISPRO 3 UNITS: 100 INJECTION, SOLUTION INTRAVENOUS; SUBCUTANEOUS at 07:22

## 2021-01-01 RX ADMIN — ASPIRIN 81 MG: 81 TABLET, COATED ORAL at 09:54

## 2021-01-01 RX ADMIN — Medication 1 CAPSULE: at 10:53

## 2021-01-01 RX ADMIN — CETIRIZINE HYDROCHLORIDE 10 MG: 10 TABLET, FILM COATED ORAL at 09:44

## 2021-01-01 RX ADMIN — HEPARIN SODIUM 5000 UNITS: 5000 INJECTION INTRAVENOUS; SUBCUTANEOUS at 00:42

## 2021-01-01 RX ADMIN — AZITHROMYCIN MONOHYDRATE 500 MG: 500 INJECTION, POWDER, LYOPHILIZED, FOR SOLUTION INTRAVENOUS at 06:23

## 2021-01-01 RX ADMIN — CETIRIZINE HYDROCHLORIDE 10 MG: 10 TABLET, FILM COATED ORAL at 08:44

## 2021-01-01 RX ADMIN — CEFTRIAXONE SODIUM 1 G: 1 INJECTION, POWDER, FOR SOLUTION INTRAMUSCULAR; INTRAVENOUS at 00:59

## 2021-01-01 RX ADMIN — INSULIN LISPRO 10 UNITS: 100 INJECTION, SOLUTION INTRAVENOUS; SUBCUTANEOUS at 08:00

## 2021-01-01 RX ADMIN — AMLODIPINE BESYLATE 5 MG: 5 TABLET ORAL at 09:54

## 2021-01-01 RX ADMIN — PERFLUTREN 2 ML: 6.52 INJECTION, SUSPENSION INTRAVENOUS at 09:38

## 2021-01-01 RX ADMIN — PREDNISONE 40 MG: 20 TABLET ORAL at 07:23

## 2021-01-01 RX ADMIN — HYDROMORPHONE HYDROCHLORIDE 1 MG: 5 LIQUID ORAL at 01:40

## 2021-01-01 RX ADMIN — ATENOLOL 25 MG: 25 TABLET ORAL at 09:45

## 2021-01-01 RX ADMIN — FUROSEMIDE 40 MG: 10 INJECTION, SOLUTION INTRAMUSCULAR; INTRAVENOUS at 10:54

## 2021-01-01 RX ADMIN — INSULIN LISPRO 7 UNITS: 100 INJECTION, SOLUTION INTRAVENOUS; SUBCUTANEOUS at 07:50

## 2021-01-01 RX ADMIN — GLYCOPYRROLATE 0.2 MG: 0.2 INJECTION INTRAMUSCULAR; INTRAVENOUS at 07:20

## 2021-01-01 RX ADMIN — GLYCOPYRROLATE 0.2 MG: 0.2 INJECTION INTRAMUSCULAR; INTRAVENOUS at 15:28

## 2021-01-01 RX ADMIN — DULOXETINE HYDROCHLORIDE 60 MG: 60 CAPSULE, DELAYED RELEASE ORAL at 10:53

## 2021-01-01 RX ADMIN — ALBUMIN (HUMAN) 25 G: 12.5 INJECTION, SOLUTION INTRAVENOUS at 23:55

## 2021-01-01 RX ADMIN — ALBUMIN (HUMAN) 12.5 G: 0.25 INJECTION, SOLUTION INTRAVENOUS at 21:54

## 2021-01-01 RX ADMIN — ACETAMINOPHEN 650 MG: 325 TABLET, FILM COATED ORAL at 15:45

## 2021-01-01 RX ADMIN — IPRATROPIUM BROMIDE AND ALBUTEROL SULFATE 3 ML: .5; 3 SOLUTION RESPIRATORY (INHALATION) at 08:44

## 2021-01-01 RX ADMIN — ALPRAZOLAM 0.25 MG: 0.25 TABLET ORAL at 00:42

## 2021-01-01 RX ADMIN — HEPARIN SODIUM 5000 UNITS: 5000 INJECTION INTRAVENOUS; SUBCUTANEOUS at 08:44

## 2021-01-01 RX ADMIN — SODIUM ZIRCONIUM CYCLOSILICATE 5 G: 5 POWDER, FOR SUSPENSION ORAL at 12:22

## 2021-01-01 RX ADMIN — DOXAZOSIN 1 MG: 1 TABLET ORAL at 09:46

## 2021-01-01 RX ADMIN — LORAZEPAM 0.5 MG: 2 SOLUTION, CONCENTRATE ORAL at 23:52

## 2021-01-01 RX ADMIN — HYDROMORPHONE HYDROCHLORIDE 1 MG: 5 LIQUID ORAL at 21:46

## 2021-01-01 RX ADMIN — Medication 10 ML: at 07:10

## 2021-01-01 RX ADMIN — INSULIN LISPRO 2 UNITS: 100 INJECTION, SOLUTION INTRAVENOUS; SUBCUTANEOUS at 12:22

## 2021-01-01 RX ADMIN — HYDROMORPHONE HYDROCHLORIDE 1 MG: 5 LIQUID ORAL at 06:05

## 2021-01-01 RX ADMIN — AMLODIPINE BESYLATE 5 MG: 5 TABLET ORAL at 09:44

## 2021-01-01 RX ADMIN — CLONIDINE HYDROCHLORIDE 0.2 MG: 0.1 TABLET ORAL at 17:20

## 2021-01-01 RX ADMIN — HEPARIN SODIUM 5000 UNITS: 5000 INJECTION INTRAVENOUS; SUBCUTANEOUS at 01:07

## 2021-01-01 RX ADMIN — Medication 10 ML: at 14:27

## 2021-01-01 RX ADMIN — IPRATROPIUM BROMIDE AND ALBUTEROL SULFATE 3 ML: .5; 3 SOLUTION RESPIRATORY (INHALATION) at 07:00

## 2021-01-01 RX ADMIN — IPRATROPIUM BROMIDE AND ALBUTEROL SULFATE 3 ML: .5; 3 SOLUTION RESPIRATORY (INHALATION) at 13:37

## 2021-01-01 RX ADMIN — INSULIN LISPRO 3 UNITS: 100 INJECTION, SOLUTION INTRAVENOUS; SUBCUTANEOUS at 17:21

## 2021-01-01 RX ADMIN — GLYCOPYRROLATE 0.2 MG: 0.2 INJECTION INTRAMUSCULAR; INTRAVENOUS at 03:22

## 2021-01-01 RX ADMIN — LOSARTAN POTASSIUM 100 MG: 50 TABLET, FILM COATED ORAL at 10:52

## 2021-01-01 RX ADMIN — SODIUM BICARBONATE 50 MEQ: 84 INJECTION, SOLUTION INTRAVENOUS at 05:39

## 2021-01-01 RX ADMIN — HYDROMORPHONE HYDROCHLORIDE 1 MG: 5 LIQUID ORAL at 00:54

## 2021-01-01 RX ADMIN — SODIUM CHLORIDE 500 ML: 9 INJECTION, SOLUTION INTRAVENOUS at 14:47

## 2021-01-01 RX ADMIN — HYDROMORPHONE HYDROCHLORIDE 1 MG: 5 LIQUID ORAL at 22:55

## 2021-01-01 RX ADMIN — HYDROMORPHONE HYDROCHLORIDE 1 MG: 5 LIQUID ORAL at 23:52

## 2021-01-01 RX ADMIN — AZITHROMYCIN MONOHYDRATE 500 MG: 500 INJECTION, POWDER, LYOPHILIZED, FOR SOLUTION INTRAVENOUS at 01:50

## 2021-01-01 RX ADMIN — CETIRIZINE HYDROCHLORIDE 10 MG: 10 TABLET, FILM COATED ORAL at 09:54

## 2021-01-01 RX ADMIN — ASPIRIN 81 MG: 81 TABLET, COATED ORAL at 09:44

## 2021-01-01 RX ADMIN — Medication 10 ML: at 05:40

## 2021-01-01 RX ADMIN — INSULIN LISPRO 4 UNITS: 100 INJECTION, SOLUTION INTRAVENOUS; SUBCUTANEOUS at 23:34

## 2021-01-01 RX ADMIN — LORAZEPAM 0.5 MG: 2 SOLUTION, CONCENTRATE ORAL at 00:54

## 2021-01-01 RX ADMIN — ALLOPURINOL 200 MG: 100 TABLET ORAL at 08:44

## 2021-01-01 RX ADMIN — HEPARIN SODIUM 5000 UNITS: 5000 INJECTION INTRAVENOUS; SUBCUTANEOUS at 09:54

## 2021-01-01 RX ADMIN — HYDROMORPHONE HYDROCHLORIDE 0.5 MG: 1 INJECTION, SOLUTION INTRAMUSCULAR; INTRAVENOUS; SUBCUTANEOUS at 03:22

## 2021-01-01 RX ADMIN — CEFTRIAXONE SODIUM 1 G: 1 INJECTION, POWDER, FOR SOLUTION INTRAMUSCULAR; INTRAVENOUS at 05:29

## 2021-01-01 RX ADMIN — PREDNISONE 40 MG: 20 TABLET ORAL at 08:41

## 2021-01-01 RX ADMIN — LORAZEPAM 0.5 MG: 2 SOLUTION, CONCENTRATE ORAL at 22:55

## 2021-01-01 RX ADMIN — ROSUVASTATIN 5 MG: 10 TABLET, FILM COATED ORAL at 23:34

## 2021-01-01 RX ADMIN — CEFTRIAXONE SODIUM 1 G: 1 INJECTION, POWDER, FOR SOLUTION INTRAMUSCULAR; INTRAVENOUS at 01:07

## 2021-01-01 RX ADMIN — PREDNISONE 40 MG: 20 TABLET ORAL at 08:44

## 2021-01-01 RX ADMIN — HUMAN INSULIN 10 UNITS: 100 INJECTION, SOLUTION SUBCUTANEOUS at 05:39

## 2021-01-01 RX ADMIN — ASPIRIN 81 MG: 81 TABLET, COATED ORAL at 10:52

## 2021-01-01 RX ADMIN — INSULIN LISPRO 3 UNITS: 100 INJECTION, SOLUTION INTRAVENOUS; SUBCUTANEOUS at 21:53

## 2021-01-01 RX ADMIN — CALCIUM GLUCONATE 1 G: 98 INJECTION, SOLUTION INTRAVENOUS at 05:38

## 2021-01-01 RX ADMIN — LORAZEPAM 0.5 MG: 2 SOLUTION, CONCENTRATE ORAL at 03:42

## 2021-01-01 RX ADMIN — GLYCOPYRROLATE 0.2 MG: 0.2 INJECTION INTRAMUSCULAR; INTRAVENOUS at 00:48

## 2021-01-01 RX ADMIN — Medication 1 CAPSULE: at 09:44

## 2021-01-01 RX ADMIN — HYDROMORPHONE HYDROCHLORIDE 0.5 MG: 1 INJECTION, SOLUTION INTRAMUSCULAR; INTRAVENOUS; SUBCUTANEOUS at 19:57

## 2021-01-01 RX ADMIN — DEXTROSE MONOHYDRATE 25 G: 25 INJECTION, SOLUTION INTRAVENOUS at 08:00

## 2021-01-01 RX ADMIN — IPRATROPIUM BROMIDE AND ALBUTEROL SULFATE 3 ML: .5; 3 SOLUTION RESPIRATORY (INHALATION) at 13:11

## 2021-01-01 RX ADMIN — CLONIDINE HYDROCHLORIDE 0.2 MG: 0.1 TABLET ORAL at 09:54

## 2021-01-01 RX ADMIN — CEFTRIAXONE SODIUM 1 G: 1 INJECTION, POWDER, FOR SOLUTION INTRAMUSCULAR; INTRAVENOUS at 01:46

## 2021-01-01 RX ADMIN — LORAZEPAM 0.5 MG: 2 SOLUTION, CONCENTRATE ORAL at 06:05

## 2021-01-01 RX ADMIN — LORAZEPAM 0.5 MG: 2 SOLUTION, CONCENTRATE ORAL at 01:40

## 2021-01-01 RX ADMIN — SODIUM POLYSTYRENE SULFONATE 15 G: 15 SUSPENSION ORAL; RECTAL at 01:07

## 2021-01-01 RX ADMIN — HYDROMORPHONE HYDROCHLORIDE 0.5 MG: 1 INJECTION, SOLUTION INTRAMUSCULAR; INTRAVENOUS; SUBCUTANEOUS at 13:24

## 2021-01-01 RX ADMIN — AZITHROMYCIN MONOHYDRATE 500 MG: 500 INJECTION, POWDER, LYOPHILIZED, FOR SOLUTION INTRAVENOUS at 03:36

## 2021-01-01 RX ADMIN — IPRATROPIUM BROMIDE AND ALBUTEROL SULFATE 3 ML: .5; 3 SOLUTION RESPIRATORY (INHALATION) at 12:49

## 2021-01-01 RX ADMIN — Medication 1 CAPSULE: at 08:44

## 2021-01-01 RX ADMIN — DULOXETINE HYDROCHLORIDE 60 MG: 60 CAPSULE, DELAYED RELEASE ORAL at 08:44

## 2021-01-01 RX ADMIN — DULOXETINE HYDROCHLORIDE 60 MG: 60 CAPSULE, DELAYED RELEASE ORAL at 09:47

## 2021-01-01 RX ADMIN — LEVOFLOXACIN 750 MG: 5 INJECTION, SOLUTION INTRAVENOUS at 20:29

## 2021-01-01 RX ADMIN — ASPIRIN 81 MG: 81 TABLET, COATED ORAL at 08:44

## 2021-01-01 RX ADMIN — HEPARIN SODIUM 5000 UNITS: 5000 INJECTION INTRAVENOUS; SUBCUTANEOUS at 01:45

## 2021-01-01 RX ADMIN — HYDROMORPHONE HYDROCHLORIDE 2 MG: 5 LIQUID ORAL at 06:59

## 2021-01-01 RX ADMIN — ROSUVASTATIN 5 MG: 10 TABLET, FILM COATED ORAL at 02:44

## 2021-01-01 RX ADMIN — INSULIN LISPRO 5 UNITS: 100 INJECTION, SOLUTION INTRAVENOUS; SUBCUTANEOUS at 17:54

## 2021-01-01 RX ADMIN — DEXTROSE MONOHYDRATE 25 G: 25 INJECTION, SOLUTION INTRAVENOUS at 05:39

## 2021-01-01 RX ADMIN — ATENOLOL 25 MG: 25 TABLET ORAL at 10:53

## 2021-01-01 RX ADMIN — IPRATROPIUM BROMIDE AND ALBUTEROL SULFATE 3 ML: .5; 3 SOLUTION RESPIRATORY (INHALATION) at 01:07

## 2021-01-01 RX ADMIN — AZITHROMYCIN MONOHYDRATE 500 MG: 500 INJECTION, POWDER, LYOPHILIZED, FOR SOLUTION INTRAVENOUS at 01:11

## 2021-01-01 RX ADMIN — Medication 10 ML: at 15:47

## 2021-01-01 RX ADMIN — INSULIN LISPRO 2 UNITS: 100 INJECTION, SOLUTION INTRAVENOUS; SUBCUTANEOUS at 07:09

## 2021-01-01 RX ADMIN — LORAZEPAM 1 MG: 2 SOLUTION, CONCENTRATE ORAL at 06:59

## 2021-01-01 RX ADMIN — LORAZEPAM 0.5 MG: 2 SOLUTION, CONCENTRATE ORAL at 21:46

## 2021-01-01 RX ADMIN — HYDROMORPHONE HYDROCHLORIDE 0.5 MG: 1 INJECTION, SOLUTION INTRAMUSCULAR; INTRAVENOUS; SUBCUTANEOUS at 00:48

## 2021-01-01 RX ADMIN — INSULIN LISPRO 5 UNITS: 100 INJECTION, SOLUTION INTRAVENOUS; SUBCUTANEOUS at 12:20

## 2021-01-01 RX ADMIN — DOXAZOSIN 1 MG: 1 TABLET ORAL at 12:19

## 2021-01-01 RX ADMIN — HEPARIN SODIUM 5000 UNITS: 5000 INJECTION INTRAVENOUS; SUBCUTANEOUS at 17:20

## 2021-01-01 RX ADMIN — HEPARIN SODIUM 5000 UNITS: 5000 INJECTION INTRAVENOUS; SUBCUTANEOUS at 12:20

## 2021-01-01 RX ADMIN — METHYLPREDNISOLONE SODIUM SUCCINATE 125 MG: 125 INJECTION, POWDER, FOR SOLUTION INTRAMUSCULAR; INTRAVENOUS at 20:26

## 2021-01-01 RX ADMIN — DOXAZOSIN 1 MG: 1 TABLET ORAL at 10:02

## 2021-01-01 RX ADMIN — HEPARIN SODIUM 5000 UNITS: 5000 INJECTION INTRAVENOUS; SUBCUTANEOUS at 09:44

## 2021-01-01 RX ADMIN — INSULIN LISPRO 3 UNITS: 100 INJECTION, SOLUTION INTRAVENOUS; SUBCUTANEOUS at 12:26

## 2021-01-01 RX ADMIN — DULOXETINE HYDROCHLORIDE 60 MG: 60 CAPSULE, DELAYED RELEASE ORAL at 09:53

## 2021-01-01 RX ADMIN — CETIRIZINE HYDROCHLORIDE 10 MG: 10 TABLET, FILM COATED ORAL at 10:53

## 2021-01-01 RX ADMIN — ALLOPURINOL 300 MG: 300 TABLET ORAL at 10:53

## 2021-01-01 RX ADMIN — Medication 10 ML: at 21:25

## 2021-01-01 RX ADMIN — LORAZEPAM 0.5 MG: 2 INJECTION INTRAMUSCULAR; INTRAVENOUS at 19:57

## 2021-01-01 RX ADMIN — ATENOLOL 25 MG: 25 TABLET ORAL at 09:54

## 2021-01-01 RX ADMIN — LORAZEPAM 0.5 MG: 2 INJECTION INTRAMUSCULAR; INTRAVENOUS at 00:48

## 2021-01-01 RX ADMIN — LORAZEPAM 0.5 MG: 2 INJECTION INTRAMUSCULAR; INTRAVENOUS at 03:22

## 2021-01-01 RX ADMIN — PREDNISONE 40 MG: 20 TABLET ORAL at 07:43

## 2021-01-01 RX ADMIN — SODIUM ZIRCONIUM CYCLOSILICATE 5 G: 5 POWDER, FOR SUSPENSION ORAL at 12:05

## 2021-01-01 RX ADMIN — AMLODIPINE BESYLATE 5 MG: 5 TABLET ORAL at 10:53

## 2021-01-01 RX ADMIN — Medication 10 ML: at 21:53

## 2021-01-01 RX ADMIN — Medication 10 ML: at 22:14

## 2021-01-01 RX ADMIN — INSULIN LISPRO 3 UNITS: 100 INJECTION, SOLUTION INTRAVENOUS; SUBCUTANEOUS at 17:50

## 2021-01-01 RX ADMIN — HEPARIN SODIUM 5000 UNITS: 5000 INJECTION INTRAVENOUS; SUBCUTANEOUS at 17:54

## 2021-01-01 RX ADMIN — ALBUMIN (HUMAN) 12.5 G: 0.25 INJECTION, SOLUTION INTRAVENOUS at 08:48

## 2021-04-16 PROBLEM — J96.01 ACUTE RESPIRATORY FAILURE WITH HYPOXIA (HCC): Status: ACTIVE | Noted: 2021-01-01

## 2021-04-16 NOTE — ED TRIAGE NOTES
Pt arrives to ED via wheelchair accompanied by daughter with c/o SOB and cough x 2 days. Denies fever. Referred by PCP for further evaluation. Received 1st Covid vaccine, Moderna, on 3/30/21.

## 2021-04-16 NOTE — ED PROVIDER NOTES
Date of Service: 
4/16/2021 Patient: Alistair Larsen Chief Complaint: 
Shortness of Breath HPI: 
Alistair Larsen is a 66 y.o.  female who presents for evaluation of shortness of breath. Patient states for the last years has been having trouble with her breathing. States from time to time it flares up when she has coughing spells. Over the last 2 days it has been getting acutely worse noting lots of coughing and shortness of breath with any type of exertion she. She denies any type of fevers chills chest pain abdominal pain nausea vomiting diarrhea headaches or other acute complaints. Just some type of pulmonary monitor that she wears and was told yesterday to come for evaluation due to hypoxia. She states she did feel like it. She came today but she was so short of breath and arrived in our triage [place] with O2 saturation in the high 60s. During my evaluation, patient is resting comfortably speaking in full sentences 93% to 98% on 3 L nasal cannula. Patient denies any history of blood clots History reviewed. No pertinent past medical history. History reviewed. No pertinent surgical history. History reviewed. No pertinent family history. Social History Socioeconomic History  Marital status:  Spouse name: Not on file  Number of children: Not on file  Years of education: Not on file  Highest education level: Not on file Occupational History  Not on file Social Needs  Financial resource strain: Not on file  Food insecurity Worry: Not on file Inability: Not on file  Transportation needs Medical: Not on file Non-medical: Not on file Tobacco Use  Smoking status: Former Smoker  Smokeless tobacco: Never Used Substance and Sexual Activity  Alcohol use: Not Currently Comment: rarely  Drug use: Never  Sexual activity: Not on file Lifestyle  Physical activity Days per week: Not on file   Minutes per session: Not on file  Stress: Not on file Relationships  Social connections Talks on phone: Not on file Gets together: Not on file Attends Holiness service: Not on file Active member of club or organization: Not on file Attends meetings of clubs or organizations: Not on file Relationship status: Not on file  Intimate partner violence Fear of current or ex partner: Not on file Emotionally abused: Not on file Physically abused: Not on file Forced sexual activity: Not on file Other Topics Concern  Not on file Social History Narrative  Not on file ALLERGIES: Ibuprofen and Codeine Review of Systems Constitutional: Negative for fever. HENT: Negative for hearing loss. Eyes: Negative for visual disturbance. Respiratory: Positive for cough and shortness of breath. Cardiovascular: Negative for chest pain. Gastrointestinal: Negative for abdominal pain. Genitourinary: Negative for flank pain. Musculoskeletal: Negative for back pain. Skin: Negative for rash. Neurological: Negative for dizziness and light-headedness. Psychiatric/Behavioral: Negative for confusion. Vitals:  
 04/16/21 1504 04/16/21 1506 BP: 131/66 Pulse: 88 87 Resp: 22 Temp: 97.7 °F (36.5 °C) SpO2: (!) 69% 97% Weight: 113.4 kg (250 lb) Height: 5' 4\" (1.626 m) Physical Exam 
Vitals signs and nursing note reviewed. Constitutional:   
   General: She is not in acute distress. Appearance: She is well-developed. She is not ill-appearing, toxic-appearing or diaphoretic. HENT:  
   Head: Normocephalic and atraumatic. Mouth/Throat:  
   Mouth: Mucous membranes are moist.  
Eyes:  
   General: No scleral icterus. Conjunctiva/sclera: Conjunctivae normal.  
   Pupils: Pupils are equal, round, and reactive to light. Neck: Musculoskeletal: Neck supple. Trachea: No tracheal deviation.   
Cardiovascular:  
   Rate and Rhythm: Normal rate and regular rhythm. Heart sounds: Murmur present. No gallop. Pulmonary:  
   Effort: Pulmonary effort is normal. No respiratory distress. Breath sounds: Examination of the right-lower field reveals decreased breath sounds. Examination of the left-lower field reveals decreased breath sounds. Decreased breath sounds present. Chest:  
   Chest wall: No mass or tenderness. Abdominal:  
   General: There is no distension. Palpations: Abdomen is soft. Tenderness: There is no abdominal tenderness. Musculoskeletal: Normal range of motion. General: No tenderness or deformity. Right lower leg: She exhibits no tenderness. No edema. Left lower leg: She exhibits no tenderness. No edema. Skin: 
   General: Skin is warm and dry. Capillary Refill: Capillary refill takes less than 2 seconds. Findings: No rash. Neurological:  
   Mental Status: She is alert and oriented to person, place, and time. Psychiatric:     
   Behavior: Behavior normal.     
   Thought Content: Thought content normal.     
   Judgment: Judgment normal.  
 
  
 
MDM Number of Diagnoses or Management Options VITAL SIGNS: 
Patient Vitals for the past 4 hrs: 
 Temp Pulse Resp BP SpO2  
04/16/21 1506  87   97 % 04/16/21 1504 97.7 °F (36.5 °C) 88 22 131/66 (!) 69 % LABS: 
Recent Results (from the past 6 hour(s)) EKG, 12 LEAD, INITIAL Collection Time: 04/16/21  3:54 PM  
Result Value Ref Range Ventricular Rate 90 BPM  
 Atrial Rate 90 BPM  
 P-R Interval 134 ms QRS Duration 84 ms Q-T Interval 358 ms QTC Calculation (Bezet) 437 ms Calculated P Axis 69 degrees Calculated R Axis 21 degrees Calculated T Axis 21 degrees Diagnosis Normal sinus rhythm Low voltage QRS No previous ECGs available CBC WITH AUTOMATED DIFF Collection Time: 04/16/21  4:52 PM  
Result Value Ref Range  WBC 8.0 3.6 - 11.0 K/uL  
 RBC 4.57 3.80 - 5.20 M/uL  
 HGB 14.2 11.5 - 16.0 g/dL HCT 49.9 (H) 35.0 - 47.0 % .2 (H) 80.0 - 99.0 FL  
 MCH 31.1 26.0 - 34.0 PG  
 MCHC 28.5 (L) 30.0 - 36.5 g/dL  
 RDW 17.8 (H) 11.5 - 14.5 % PLATELET 507 120 - 681 K/uL MPV 9.7 8.9 - 12.9 FL  
 NRBC 1.6 (H) 0  WBC ABSOLUTE NRBC 0.13 (H) 0.00 - 0.01 K/uL NEUTROPHILS 84 (H) 32 - 75 % BAND NEUTROPHILS 2 0 - 6 % LYMPHOCYTES 5 (L) 12 - 49 % MONOCYTES 5 5 - 13 % EOSINOPHILS 0 0 - 7 % BASOPHILS 0 0 - 1 % METAMYELOCYTES 2 (H) 0 % MYELOCYTES 2 (H) 0 % IMMATURE GRANULOCYTES 0 %  
 ABS. NEUTROPHILS 6.9 1.8 - 8.0 K/UL  
 ABS. LYMPHOCYTES 0.4 (L) 0.8 - 3.5 K/UL  
 ABS. MONOCYTES 0.4 0.0 - 1.0 K/UL  
 ABS. EOSINOPHILS 0.0 0.0 - 0.4 K/UL  
 ABS. BASOPHILS 0.0 0.0 - 0.1 K/UL  
 ABS. IMM. GRANS. 0.0 K/UL  
 DF MANUAL    
 RBC COMMENTS ANISOCYTOSIS 1+ 
    
 RBC COMMENTS MACROCYTOSIS 
2+ METABOLIC PANEL, COMPREHENSIVE Collection Time: 04/16/21  4:52 PM  
Result Value Ref Range Sodium 131 (L) 136 - 145 mmol/L Potassium 5.2 (H) 3.5 - 5.1 mmol/L Chloride 95 (L) 97 - 108 mmol/L  
 CO2 33 (H) 21 - 32 mmol/L Anion gap 3 (L) 5 - 15 mmol/L Glucose 226 (H) 65 - 100 mg/dL BUN 44 (H) 6 - 20 MG/DL Creatinine 2.26 (H) 0.55 - 1.02 MG/DL  
 BUN/Creatinine ratio 19 12 - 20 GFR est AA 25 (L) >60 ml/min/1.73m2 GFR est non-AA 21 (L) >60 ml/min/1.73m2 Calcium 8.9 8.5 - 10.1 MG/DL Bilirubin, total 0.6 0.2 - 1.0 MG/DL  
 ALT (SGPT) 26 12 - 78 U/L  
 AST (SGOT) 22 15 - 37 U/L Alk. phosphatase 137 (H) 45 - 117 U/L Protein, total 7.0 6.4 - 8.2 g/dL Albumin 3.2 (L) 3.5 - 5.0 g/dL Globulin 3.8 2.0 - 4.0 g/dL A-G Ratio 0.8 (L) 1.1 - 2.2    
TROPONIN I Collection Time: 04/16/21  4:52 PM  
Result Value Ref Range Troponin-I, Qt. <0.05 <0.05 ng/mL NT-PRO BNP Collection Time: 04/16/21  4:52 PM  
Result Value Ref Range NT pro-BNP 2,030 (H) <450 PG/ML  
SAMPLES BEING HELD  Collection Time: 04/16/21 4:52 PM  
Result Value Ref Range SAMPLES BEING HELD 2 BC, 1 RED, 1 LEANDRO   
 COMMENT Add-on orders for these samples will be processed based on acceptable specimen integrity and analyte stability, which may vary by analyte. D DIMER Collection Time: 04/16/21  4:52 PM  
Result Value Ref Range D-dimer 1.32 (H) 0.00 - 0.65 mg/L FEU IMAGING: 
XR CHEST PORT Final Result 1. Unchanged mild right pleural effusion. 2. Increased consolidation of the medial right base. 3. Unchanged scarring in the mid right lung. NM LUNG VENT/PERF    (Results Pending) Medications During Visit: 
Medications - No data to display DECISION MAKING: 
Kirt Lopez is a 66 y.o. female who comes in as above. Here, patient is hypoxic upon arrival.  Patient required supplemental oxygen diagnostics as above showed probable pneumonia in the setting of underlying COPD exacerbation. V/Q was ordered but after speaking with radiology based on the patient's x-ray does not be a worthwhile study to obtain at this time, I do have a low suspicion that this is a pulmonary embolus. This time we will start the patient on antibiotics and steroids continue supplemental oxygen maintaining oxygen between 88 and 92 has not cause any further hypercapnia. Patient remains hemodynamically stable and is agreeable to stay in the hospital. 
 
Total critical care time spent exclusive of procedures:  52 minutes IMPRESSION: 
1. Hypoxia 2. BARNEY (dyspnea on exertion) DISPOSITION: 
Admitted Procedures Perfect Serve Consult for Admission 7:03 PM 
 
ED Room Number: RF87/85 Patient Name and age: Kirt Lopez 66 y.o.  female Working Diagnosis: 1. Hypoxia 2. BARNEY (dyspnea on exertion) COVID-19 Suspicion:  no 
Sepsis present:  no  Reassessment needed: no 
Code Status:  Full Code Readmission: no 
Isolation Requirements:  no 
Recommended Level of Care:  telemetry Department:[facility] Adult ED - [phone] Other:  Hypoxic to the 60s, high 90s on supplemental 02. Came in for SOB worse over last few days. Renal insuf. No recent illness. No dvt history. VQ ordered.

## 2021-04-17 NOTE — PROGRESS NOTES
Problem: Falls - Risk of 
Goal: *Absence of Falls Description: Document Devere Chauvin Fall Risk and appropriate interventions in the flowsheet. Outcome: Progressing Towards Goal 
Note: Fall Risk Interventions: 
Mobility Interventions: Bed/chair exit alarm Mentation Interventions: Bed/chair exit alarm, Adequate sleep, hydration, pain control Medication Interventions: Bed/chair exit alarm Elimination Interventions: Call light in reach, Bed/chair exit alarm Problem: Pressure Injury - Risk of 
Goal: *Prevention of pressure injury Description: Document Nahid Scale and appropriate interventions in the flowsheet. Outcome: Progressing Towards Goal 
Note: Pressure Injury Interventions: 
Sensory Interventions: Maintain/enhance activity level Moisture Interventions: Maintain skin hydration (lotion/cream) Activity Interventions: PT/OT evaluation Mobility Interventions: PT/OT evaluation Nutrition Interventions: Document food/fluid/supplement intake Friction and Shear Interventions: Apply protective barrier, creams and emollients

## 2021-04-17 NOTE — H&P
[facility] [place] HISTORY AND PHYSICAL Name:  Mian Bartholomew 
MR#:  [id number] :  1945 ACCOUNT #:  [de-identified] ADMIT DATE:  2021 The patient was seen, evaluated, and admitted by me on 2021. PRIMARY CARE PHYSICIAN:  Lane Lester MD 
 
SOURCE OF INFORMATION:  The patient and review of ED electronic medical records. CHIEF COMPLAINT:  Shortness of breath. HISTORY OF PRESENT ILLNESS:  This is a 64-year-old woman with past medical history significant for COPD, hypertension, type 2 diabetes, dyslipidemia, chronic kidney disease, who was in her usual state of health until a couple of days ago when the patient developed worsening of shortness of breath. The patient has shortness of breath at baseline, because of her COPD. This got worse in the past couple of days. The shortness of breath is worse with exertion such as walking and also when the patient is lying down flat. The shortness of breath is associated with cough. The cough is nonproductive. The patient denies fever, rigors, or chills. When the patient arrived at the emergency room, oxygen saturation was in high 60s. She was then placed on 3 L of oxygen and the oxygen saturation improved to 3 L. Although the patient has COPD, she is not on oxygen at home. This is a new oxygen requirement. The chest x-ray done on arrival at the emergency room shows evidence of pleural effusion and possible consolidation. The patient received breathing treatment and was subsequently referred to the hospitalist service for evaluation for admission. BNP was elevated as well. The patient denies history of congestive heart failure. The patient has history of coronary artery disease by calcification. She is under the care of cardiologist.  The patient has not had a stent or CABG done in the past. 
 
PAST MEDICAL HISTORY:  COPD, hypertension, type 2 diabetes, dyslipidemia, chronic kidney disease. ALLERGIES:  THE PATIENT IS ALLERGIC TO CODEINE AND IBUPROFEN. MEDICATIONS: 
1. Albuterol 90 mcg, dosage as directed. 2.  Albuterol nebulizer every 4 hours as needed. 3.  Allopurinol 300 mg daily. 4.  Norvasc 5 mg daily. 5.  Aspirin 81 mg daily. 6.  Atenolol 25 mg daily. 7.  Zyrtec 10 mg daily. 8.  Clonidine 0.2 mg twice daily. 9.  Cardura 1 mg daily. 10.  Cymbalta 60 mg daily. 11.  Lasix 40 mg daily. 12.  Amaryl 4 mg daily in the morning. 13. NPH insulin 70/30 60 units subcutaneously daily at bedtime. 14.  Avapro 300 mg daily. 15.  Losartan 100 mg daily. 16.  Crestor 5 mg daily. 17.  Trelegy inhalation daily. 18.  Anoro inhalation daily. FAMILY HISTORY:  This was reviewed. Her mother had heart disease and hypertension. PAST SURGICAL HISTORY:  Not significant. SOCIAL HISTORY:  The patient is a former smoker. No history of alcohol abuse. REVIEW OF SYSTEMS: 
HEAD, EYES, EARS, NOSE, AND THROAT:  No headache, no dizziness, no blurring of vision, no photophobia. RESPIRATORY SYSTEM:  This is positive for cough and shortness of breath. No hemoptysis. CARDIOVASCULAR SYSTEM:  This is positive for orthopnea. No chest pain, no palpitation. GASTROINTESTINAL SYSTEM:  No nausea or vomiting. No diarrhea. No constipation. GENITOURINARY SYSTEM:  No dysuria, no urgency, and no frequency. All other systems are reviewed and they are negative. PHYSICAL EXAMINATION: 
GENERAL APPEARANCE:  The patient appeared ill, in moderate distress. VITAL SIGNS:  On arrival at the emergency room, temperature 97.7, pulse 88, respiratory rate 22, blood pressure 131/66, oxygen saturation 69% on room air. HEENT:  Head:  Normocephalic, atraumatic. Eyes:  Normal eye movement. No redness, no drainage, no discharge. Ears:  Normal external ears with no evidence of drainage. Nose:  No deformity and no drainage. Mouth and Throat:  No visible oral lesion. NECK:  Neck is supple. Mild JVD. No thyromegaly.  
CHEST:  Diffuse expiratory wheezing and bilateral basilar crackles. HEART:  Normal S1 and S2, regular. No clinically appreciable murmur. ABDOMEN:  Soft, obese, nontender. Normal bowel sounds. CNS:  Alert and oriented x3. No gross focal neurological deficit. EXTREMITIES:  Edema 2+. Pulses 2+ bilaterally. MUSCULOSKELETAL SYSTEM:  No evidence of joint deformity or swelling. SKIN:  No active skin lesions seen in the exposed part of the body. PSYCHIATRY:  Normal mood and affect. LYMPHATIC SYSTEM:  No cervical lymphadenopathy. DIAGNOSTIC DATA:  EKG shows normal sinus rhythm. No significant ST and T-waves abnormalities. Chest x-ray shows unchanged mild right pleural effusion, increased consolidation of the medial right base, unchanged scarring in the mid right lung. LABORATORY DATA:  Chemistry:  Sodium 131, potassium 5.2, chloride 95, CO2 33, glucose 226, BUN 44, creatinine 2.26, calcium 8.9, total bilirubin 0.6, ALT 26, AST 22, alkaline phosphatase 137, total protein at 7.0, albumin level 3.2, globulin at 3.8. Pro-BNP level 2030. Cardiac Profile:  Troponin less than 0.05.  D-dimer 1.32. Hematology:  WBC 8.0, hemoglobin at 14.2, hematocrit 49.9, platelets 446. ASSESSMENT: 
1. Acute respiratory failure with hypoxia. 2.  Chronic obstructive pulmonary disease with acute exacerbation. 3.  Acute congestive heart failure. 4.  Hypertension. 5.  Type 2 diabetes with hyperglycemia. 6.  Dyslipidemia. 7.  Morbid obesity. 8.  Elevated D-dimer. 9.  Hyponatremia. 10.  Hyperkalemia. 11.  Chronic kidney disease, stage IV. 12.  Suspected bacterial pneumonia. PLAN: 
1. Acute respiratory failure with hypoxia. We will admit the patient for further evaluation and treatment. We will identify and treat underlying etiological factors, which include COPD, CHF, and suspected bacterial pneumonia. This will be discussed below. We will continue with supplemental oxygen.   We will check serial cardiac markers to rule out acute myocardial infarction. The patient is awaiting a V/Q scan of the chest, ordered by the emergency room physician to evaluate the patient for thromboembolism as another possible cause of acute respiratory failure with hypoxia. We will continue with supplemental oxygen. 2.  COPD with acute exacerbation. We will start the patient on short course of prednisone. This is contributing to the patient's acute respiratory failure with hypoxia. We will monitor the patient closely. 3.  Acute congestive heart failure. We will start the patient on Lasix. We will obtain echocardiogram to determine the ejection fraction and the type of congestive heart failure. We will check serial cardiac markers to rule out acute myocardial infarction. The patient's Cardiology Group will be consulted to assist in further evaluation and treatment. This is also contributing to the patient's acute respiratory failure with hypoxia. 4.  Hypertension. We will resume preadmission medication. We will monitor the patient's blood pressure closely. 5.  Type 2 diabetes with hyperglycemia. The patient will be started on sliding scale with insulin coverage. We will check hemoglobin A1c level, if one has not been checked recently. We will hold oral agents until the time of discharge from the hospital. 
6.  Dyslipidemia. We will continue with Crestor. 7.  Morbid obesity. Diet and exercise advised. Consider inpatient dietary consult if indicated. We will check a TSH level. 8.  Elevated D-dimer. This is in the setting of acute respiratory failure with hypoxia. We will obtain an ultrasound of the lower extremity to evaluate the patient for DVT. We will also await the results of V/Q scan of the chest to evaluate the patient for thromboembolism. 9.  Hyponatremia. This is most likely due to congestive heart failure. We will monitor the patient's sodium level closely.   We will await further recommendation from the nephrologist. 
10. Hyperkalemia. We will treat the patient for hyperkalemia as per protocol. We will also await further recommendation from the nephrologist. 
11.  Chronic kidney disease, stage IV. We will continue to monitor the patient's renal function. Nephrology consult has been requested. We will await further recommendation from the nephrologist. 
12.  Suspected bacterial pneumonia. This is also contributing to the patient's acute respiratory failure with hypoxia. We will start the patient on Rocephin and Zithromax. We will await the results of CT scan of the chest for further evaluation of the suspected pneumonia. 13.  Other Issues:  Code Status: The patient is a full code. We will place the patient on heparin for DVT prophylaxis. FUNCTIONAL STATUS PRIOR TO ADMISSION:  The patient came from home. The patient is ambulatory with no assistant or device. COVID PRECAUTION:  The patient was wearing a face mask. I was wearing a face mask and gloves for this patient's encounter. Beryl Sommer MD 
 
 
[id number] 
D:  04/17/2021 6:05 
T:  04/17/2021 7:51 
JOB #:  [id number] CC:  Rakesh Gallo MD

## 2021-04-17 NOTE — PROGRESS NOTES
Bedside shift change report given to Kaylyn Becerra (oncoming nurse) by BODØ (offgoing nurse). Report included the following information SBAR, Kardex, MAR and Recent Results.

## 2021-04-17 NOTE — CONSULTS
Consultation Note NAME: Noelle Elmore :  1945 MRN:  [id number] Date/Time:  2021 12:33 PM 
 
I have been asked to see this patient by Dr. Renzo De Leon  for advice/opinion re: IDALIA. Assessment :    Plan: 
Mild IDALIA on CKD stage 4 Hyperkalemia Hyponatremia HTN 
DM2 Hypoxia Creatinine 2.4 (follows with Dr. Carly Edmonds; baseline creatinine 2.2; CKD from DM/HTN/NSAIDS) H/o kidney mass (was to have had a MAGDALENA on Thursday -- check now) K 6.6; getting Lasix 40 mg iv daily; hold losartan; s/p 45 grams kayexalate this am; will add lokelma if K continues to be high; low K diet Na 129 -- free water restrict and continue IV lasix Subjective: CHIEF COMPLAINT: idalia HISTORY OF PRESENT ILLNESS:    
Mor Jin is a 66 y.o.   female who has a history of the below. Follows with Dr. Carly Edmonds. SOB for months, worse in the last few days. She has had high K in the past, mild. Eating more bananas lately. Took Advil last week. She had covid vaccine a couple of weeks ago and they are worried this may have contributed to her decline. Past Medical History:  
Diagnosis Date  COPD (chronic obstructive pulmonary disease) ([facility])  Diabetes ([facility] [address] History reviewed. No pertinent surgical history. Social History Tobacco Use  Smoking status: Former Smoker  Smokeless tobacco: Never Used Substance Use Topics  Alcohol use: Not Currently Comment: rarely History reviewed. No pertinent family history. Allergies Allergen Reactions  Ibuprofen Itching Due to kidney failure, cant take any NSAID  Codeine Itching Prior to Admission medications Medication Sig Start Date End Date Taking?  Authorizing Provider  
albuterol (PROVENTIL VENTOLIN) 2.5 mg /3 mL (0.083 %) nebu USE 1 VIAL IN NEBULIZER EVERY 4 HOURS AS NEEDED 20   Provider, Historical  
albuterol (PROVENTIL HFA, VENTOLIN HFA, PROAIR HFA) 90 mcg/actuation inhaler  10/3/20   Provider, Historical  
Trelegy Ellipta 100-62.5-25 mcg inhaler INHALE 1 PUFF ONCE DAILY 9/27/20   Provider, Historical  
ONETOUCH ULTRA BLUE TEST STRIP strip USE 1 STRIP TO CHECK GLUCOSE THREE TIMES DAILY FOR 90 DAYS 8/6/19   Provider, Historical  
losartan (COZAAR) 100 mg tablet  3/17/19   Provider, Historical  
furosemide (LASIX) 40 mg tablet Take  by mouth daily. Provider, Historical  
cyanocobalamin (VITAMIN B-12) 500 mcg tablet Take 500 mcg by mouth daily. Provider, Historical  
glimepiride (AMARYL) 4 mg tablet Take  by mouth every morning. Provider, Historical  
cetirizine (ZYRTEC) 10 mg tablet Take  by mouth daily. Provider, Historical  
doxazosin (CARDURA) 2 mg tablet Take 1 mg by mouth daily. Provider, Historical  
DULoxetine (CYMBALTA) 60 mg capsule Take 60 mg by mouth daily. Provider, Historical  
amLODIPine (NORVASC) 5 mg tablet Take 5 mg by mouth daily. Provider, Historical  
allopurinol (ZYLOPRIM) 300 mg tablet Take  by mouth daily. Provider, Historical  
cloNIDine HCl (CATAPRES) 0.2 mg tablet Take  by mouth two (2) times a day. Provider, Historical  
atenolol (TENORMIN) 25 mg tablet Take 25 mg by mouth daily. Provider, Historical  
cholecalciferol, VITAMIN D3, (VITAMIN D3) 5,000 unit tab tablet Take  by mouth daily. Provider, Historical  
aspirin delayed-release 81 mg tablet Take  by mouth daily. Provider, Historical  
irbesartan (AVAPRO) 300 mg tablet Take 300 mg by mouth nightly. Provider, Historical  
umeclidinium-vilanterol (ANORO ELLIPTA) 62.5-25 mcg/actuation inhaler Take 1 Puff by inhalation daily. Provider, Historical  
insulin NPH/insulin regular (NOVOLIN 70/30 U-100 INSULIN) 100 unit/mL (70-30) injection 60 Units by SubCUTAneous route nightly. Provider, Historical  
rosuvastatin (CRESTOR) 5 mg tablet Take 1 Tab by mouth every other day. 9/13/18   Evens Fragoso MD  
 
REVIEW OF SYSTEMS:   
 []  Unable to obtain reliable ROS due to  [] mental status  [] sedated   [] intubated [x] Total of 12 systems reviewed as follows: 
Constitutional: negative fever, negative chills, negative weight loss Eyes:   negative visual changes ENT:   negative sore throat, tongue or lip swelling Respiratory:  + cough, + dyspnea Cards:  negative for chest pain, palpitations, lower extremity edema GI:   negative for nausea, vomiting, diarrhea, and abdominal pain :  negative for frequency, dysuria Integument:  negative for rash and pruritus Heme:  negative for easy bruising and gum/nose bleeding Musculoskel: negative for myalgias,  back pain and muscle weakness Neuro:  negative for headaches, dizziness, vertigo Psych:  negative for feelings of anxiety, depression Travel?: none Objective: VITALS:   
Visit Vitals BP (!) 166/90 (BP 1 Location: Right leg) Pulse 92 Temp 97.6 °F (36.4 °C) Resp 20 Ht 5' 4\" (1.626 m) Wt 116.3 kg (256 lb 4.8 oz) SpO2 93% BMI 43.99 kg/m² PHYSICAL EXAM: 
Gen:  []  WD []  WN  [] cachectic []  thin [x]  obese []  disheveled [x]  ill apearing  []   Critical  [x]   Chronic    []  No acute distress HEENT:   [] NC/AT/PERRLA/EOMI 
  [] pink conjunctivae      [] pale conjunctivae PERRL  [] yes  [] no      [] moist mucosa    [] dry mucosa 
  hearing intact to voice [x] yes  [] No 
              
NECK:   supple [x] yes  [] no        masses [] yes  [] No 
             thyroid  []  non tender  []  tender RESP:   [] CTA bilaterally/no wheezing/rhonchi/rales/crackles [] rhonchi bilaterally - no dullness  [] wheezing   [x] rhonchi   [] crackles  
  use of accessory muscles [] yes [] no CARD:   [x]  regular rate and rhythm/No murmurs/rubs/gallops 
  murmur  [] yes ()  [] no      Rubs  [] yes  [] no       Gallops [] yes  [] no 
  Rate []  regular  []  irregular        carotid bruits  [] Right  []  Left               LE edema [x] yes  [] no           JVP  []  yes   []  no 
 
ABD:    [x] soft/non distended/non tender/+bowel sounds/no HSM []  Rigid    tenderness [] yes [] no   Liver enlargement  []  yes []  no  
             Spleen enlargement  []  yes []  no     distended []  yes [] no  
  bowel sound  [] hypoactive   [] hyperactive LYMPH:    Neck []  yes []  no       Axillae []  yes []  no SKIN:   Rashes []  yes   [x]  no    Ulcers []  yes   []  no 
             [] tight to palpitation    skin turgor []  good  [] poor  [] decreased Cyanosis/clubbing []  yes []  no 
 
NEUR:   [x] cranial nerves II-XII grossly intact    
  [] Cranial nerves deficit 
               []  facial droop    []  slurred speech   [] aphasic  
  [] Strength normal     []  weakness  []  LUE  []   RUE/ []  LLE  []   RLE 
  follows commands  [x]  yes []  no        
 
PSYCH:   insight [] poor [] good   Alert and Oriented to  [x] person  [x] place  [x]  time  
                 [] depressed [] anxious [] agitated  [] lethargic [] stuporous  [] sedated LAB DATA REVIEWED:   
Recent Labs 04/17/21 0234 04/16/21 
1652 WBC 9.7 8.0 HGB 14.1 14.2 HCT 49.5* 49.9*  
 197 Recent Labs 04/17/21 
0234 04/16/21 
1652 * 131* K 6.6* 5.2*  
CL 94* 95* CO2 29 33* BUN 45* 44* CREA 2.42* 2.26* * 226* CA 8.8 8.9 MG 2.3  --   
PHOS 6.8*  --   
 
Recent Labs 04/17/21 0234 04/16/21 
1652 ALT 28 26 * 137* TBILI 0.7 0.6 ALB 3.1* 3.2*  
GLOB 4.0 3.8 No results for input(s): INR, PTP, APTT, INREXT in the last 72 hours. No results for input(s): FE, TIBC, PSAT, FERR in the last 72 hours. No results for input(s): PH, PCO2, PO2 in the last 72 hours. No results for input(s): CPK, CKMB in the last 72 hours. No lab exists for component: TROPONINI Lab Results Component Value Date/Time  Glucose (POC) 283 (H) 04/17/2021 10:55 AM  
 Glucose (POC) 319 (H) 04/17/2021 07:42 AM  
 Glucose (POC) 277 (H) 04/17/2021 05:41 AM  
 
 
Procedures: see electronic medical records for all procedures/Xrays and details which were not copied into this note but were reviewed prior to creation of Plan.   
________________________________________________________________________ 
  
 
___________________________________________________ Consulting Physician:  Abbieza Bending, MD

## 2021-04-17 NOTE — PROGRESS NOTES
Problem: Diabetes Self-Management Goal: *Disease process and treatment process Description: Define diabetes and identify own type of diabetes; list 3 options for treating diabetes. Outcome: Progressing Towards Goal 
Goal: *Incorporating nutritional management into lifestyle Description: Describe effect of type, amount and timing of food on blood glucose; list 3 methods for planning meals. Outcome: Progressing Towards Goal 
Goal: *Incorporating physical activity into lifestyle Description: State effect of exercise on blood glucose levels. Outcome: Progressing Towards Goal 
Goal: *Developing strategies to promote health/change behavior Description: Define the ABC's of diabetes; identify appropriate screenings, schedule and personal plan for screenings. Outcome: Progressing Towards Goal 
Goal: *Using medications safely Description: State effect of diabetes medications on diabetes; name diabetes medication taking, action and side effects. Outcome: Progressing Towards Goal 
Goal: *Monitoring blood glucose, interpreting and using results Description: Identify recommended blood glucose targets  and personal targets. Outcome: Progressing Towards Goal 
Goal: *Prevention, detection, treatment of acute complications Description: List symptoms of hyper- and hypoglycemia; describe how to treat low blood sugar and actions for lowering  high blood glucose level. Outcome: Progressing Towards Goal 
Goal: *Prevention, detection and treatment of chronic complications Description: Define the natural course of diabetes and describe the relationship of blood glucose levels to long term complications of diabetes. Outcome: Progressing Towards Goal 
Goal: *Developing strategies to address psychosocial issues Description: Describe feelings about living with diabetes; identify support needed and support network Outcome: Progressing Towards Goal 
Goal: *Insulin pump training Outcome: Progressing Towards Goal 
Goal: *Sick day guidelines Outcome: Progressing Towards Goal 
Goal: *Patient Specific Goal (EDIT GOAL, INSERT TEXT) Outcome: Progressing Towards Goal 
  
Problem: Patient Education: Go to Patient Education Activity Goal: Patient/Family Education Outcome: Progressing Towards Goal

## 2021-04-17 NOTE — ED NOTES
Pt's daughter, Natalie Townsend ([phone]), who has been at bedside is to go home for the evening. She requests that her sister, Sheryle Smart ([phone])

## 2021-04-17 NOTE — ROUTINE PROCESS
TRANSFER - OUT REPORT: 
 
Verbal report given to Odalys Doty RN(name) on Nabeel Munoz  being transferred to [address] [place] [address] ([facility]) for routine progression of care Report consisted of patients Situation, Background, Assessment and  
Recommendations(SBAR). Information from the following report(s) SBAR, ED Summary and MAR was reviewed with the receiving nurse. Lines:  
Peripheral IV 04/16/21 Left Antecubital (Active) Opportunity for questions and clarification was provided. Patient transported with: 
 Monitor O2 @ 3 liters Registered Nurse

## 2021-04-17 NOTE — ROUTINE PROCESS
Bedside shift change report given to iggy lees rn (oncoming nurse) by selene rn (offgoing nurse). Report included the following information SBAR and Kardex.

## 2021-04-17 NOTE — PROGRESS NOTES
[facility] Hospitalist Progress Note Jason Hampton MD 
Answering service: [phone] OR [phone] from in house phone Progress Note Patient: Jeb Castleman MRN: [id number]  SSN: [id number] YOB: 1945  Age: 66 y.o. Sex: female Admit Date: 4/16/2021 LOS: 1 day Subjective:  
 
Patient presents with COPD exacerbation as well as CHF and possible pneumonia. Has been coughing. Requiring supplemental oxygen of 2 L. At baseline patient does not use oxygen. Objective:  
 
Vitals:  
 04/17/21 0350 04/17/21 0951 04/17/21 1210 04/17/21 1216 BP: (!) 143/74 134/71  (!) 166/90 Pulse: 84 89  92 Resp: 20 20  20 Temp: 98.2 °F (36.8 °C) (!) 96.2 °F (35.7 °C)  97.6 °F (36.4 °C) SpO2: 95% 95% 95% 93% Weight: 116.3 kg (256 lb 4.8 oz) Height:      
  
 
Intake and Output: 
Current Shift: No intake/output data recorded. Last three shifts: 04/15 1901 - 04/17 0700 In: 150 [I.V.:150] Out: - Physical Exam:  
GENERAL: alert, cooperative, no distress, appears stated age THROAT & NECK: normal and no erythema or exudates noted. LUNG: clear to auscultation bilaterally HEART: regular rate and rhythm, S1, S2 normal, no murmur, click, rub or gallop ABDOMEN: soft, non-tender. Bowel sounds normal. No masses,  no organomegaly EXTREMITIES:  extremities normal, atraumatic, no cyanosis or edema SKIN: no rash or abnormalities NEUROLOGIC: AOx3. PSYCHIATRIC: non focal 
 
Lab/Data Review: All lab results for the last 24 hours reviewed. Recent Results (from the past 24 hour(s)) EKG, 12 LEAD, INITIAL Collection Time: 04/16/21  3:54 PM  
Result Value Ref Range Ventricular Rate 90 BPM  
 Atrial Rate 90 BPM  
 P-R Interval 134 ms QRS Duration 84 ms Q-T Interval 358 ms QTC Calculation (Bezet) 437 ms  Calculated P Axis 69 degrees Calculated R Axis 21 degrees Calculated T Axis 21 degrees Diagnosis Normal sinus rhythm Low voltage QRS No previous ECGs available Confirmed by Den Olguin MD. ([id number]) on 4/17/2021 2:22:24 AM 
  
CBC WITH AUTOMATED DIFF Collection Time: 04/16/21  4:52 PM  
Result Value Ref Range WBC 8.0 3.6 - 11.0 K/uL  
 RBC 4.57 3.80 - 5.20 M/uL  
 HGB 14.2 11.5 - 16.0 g/dL HCT 49.9 (H) 35.0 - 47.0 % .2 (H) 80.0 - 99.0 FL  
 MCH 31.1 26.0 - 34.0 PG  
 MCHC 28.5 (L) 30.0 - 36.5 g/dL  
 RDW 17.8 (H) 11.5 - 14.5 % PLATELET 106 364 - 476 K/uL MPV 9.7 8.9 - 12.9 FL  
 NRBC 1.6 (H) 0  WBC ABSOLUTE NRBC 0.13 (H) 0.00 - 0.01 K/uL NEUTROPHILS 84 (H) 32 - 75 % BAND NEUTROPHILS 2 0 - 6 % LYMPHOCYTES 5 (L) 12 - 49 % MONOCYTES 5 5 - 13 % EOSINOPHILS 0 0 - 7 % BASOPHILS 0 0 - 1 % METAMYELOCYTES 2 (H) 0 % MYELOCYTES 2 (H) 0 % IMMATURE GRANULOCYTES 0 %  
 ABS. NEUTROPHILS 6.9 1.8 - 8.0 K/UL  
 ABS. LYMPHOCYTES 0.4 (L) 0.8 - 3.5 K/UL  
 ABS. MONOCYTES 0.4 0.0 - 1.0 K/UL  
 ABS. EOSINOPHILS 0.0 0.0 - 0.4 K/UL  
 ABS. BASOPHILS 0.0 0.0 - 0.1 K/UL  
 ABS. IMM. GRANS. 0.0 K/UL  
 DF MANUAL    
 RBC COMMENTS ANISOCYTOSIS 1+ 
    
 RBC COMMENTS MACROCYTOSIS 
2+ METABOLIC PANEL, COMPREHENSIVE Collection Time: 04/16/21  4:52 PM  
Result Value Ref Range Sodium 131 (L) 136 - 145 mmol/L Potassium 5.2 (H) 3.5 - 5.1 mmol/L Chloride 95 (L) 97 - 108 mmol/L  
 CO2 33 (H) 21 - 32 mmol/L Anion gap 3 (L) 5 - 15 mmol/L Glucose 226 (H) 65 - 100 mg/dL BUN 44 (H) 6 - 20 MG/DL Creatinine 2.26 (H) 0.55 - 1.02 MG/DL  
 BUN/Creatinine ratio 19 12 - 20 GFR est AA 25 (L) >60 ml/min/1.73m2 GFR est non-AA 21 (L) >60 ml/min/1.73m2 Calcium 8.9 8.5 - 10.1 MG/DL Bilirubin, total 0.6 0.2 - 1.0 MG/DL  
 ALT (SGPT) 26 12 - 78 U/L  
 AST (SGOT) 22 15 - 37 U/L Alk. phosphatase 137 (H) 45 - 117 U/L Protein, total 7.0 6.4 - 8.2 g/dL  Albumin 3.2 (L) 3.5 - 5.0 g/dL Globulin 3.8 2.0 - 4.0 g/dL A-G Ratio 0.8 (L) 1.1 - 2.2    
TROPONIN I Collection Time: 04/16/21  4:52 PM  
Result Value Ref Range Troponin-I, Qt. <0.05 <0.05 ng/mL NT-PRO BNP Collection Time: 04/16/21  4:52 PM  
Result Value Ref Range NT pro-BNP 2,030 (H) <450 PG/ML  
SAMPLES BEING HELD Collection Time: 04/16/21  4:52 PM  
Result Value Ref Range SAMPLES BEING HELD 2 BC, 1 RED, 1 LEANDRO   
 COMMENT Add-on orders for these samples will be processed based on acceptable specimen integrity and analyte stability, which may vary by analyte. D DIMER Collection Time: 04/16/21  4:52 PM  
Result Value Ref Range D-dimer 1.32 (H) 0.00 - 0.65 mg/L FEU  
BLOOD GAS, VENOUS Collection Time: 04/16/21  7:50 PM  
Result Value Ref Range VENOUS PH 7.11 (LL) 7.32 - 7.42    
 VENOUS PCO2 >95.0 (H) 41 - 51 mmHg VENOUS PO2 51 (H) 25 - 40 mmHg O2 METHOD ROOM AIR Sample source VENOUS BLOOD    
 SITE OTHER Critical value read back Called to Finn Kidd MD on 04/16/2021 at 19:55 METABOLIC PANEL, COMPREHENSIVE Collection Time: 04/17/21  2:34 AM  
Result Value Ref Range Sodium 129 (L) 136 - 145 mmol/L Potassium 6.6 (HH) 3.5 - 5.1 mmol/L Chloride 94 (L) 97 - 108 mmol/L  
 CO2 29 21 - 32 mmol/L Anion gap 6 5 - 15 mmol/L Glucose 241 (H) 65 - 100 mg/dL BUN 45 (H) 6 - 20 MG/DL Creatinine 2.42 (H) 0.55 - 1.02 MG/DL  
 BUN/Creatinine ratio 19 12 - 20 GFR est AA 24 (L) >60 ml/min/1.73m2 GFR est non-AA 19 (L) >60 ml/min/1.73m2 Calcium 8.8 8.5 - 10.1 MG/DL Bilirubin, total 0.7 0.2 - 1.0 MG/DL  
 ALT (SGPT) 28 12 - 78 U/L  
 AST (SGOT) 29 15 - 37 U/L Alk. phosphatase 146 (H) 45 - 117 U/L Protein, total 7.1 6.4 - 8.2 g/dL Albumin 3.1 (L) 3.5 - 5.0 g/dL Globulin 4.0 2.0 - 4.0 g/dL A-G Ratio 0.8 (L) 1.1 - 2.2    
CBC WITH AUTOMATED DIFF Collection Time: 04/17/21  2:34 AM  
Result Value Ref Range  WBC 9.7 3.6 - 11.0 K/uL  
 RBC 4.46 3.80 - 5.20 M/uL  
 HGB 14.1 11.5 - 16.0 g/dL HCT 49.5 (H) 35.0 - 47.0 % .0 (H) 80.0 - 99.0 FL  
 MCH 31.6 26.0 - 34.0 PG  
 MCHC 28.5 (L) 30.0 - 36.5 g/dL  
 RDW 17.2 (H) 11.5 - 14.5 % PLATELET 613 960 - 005 K/uL MPV 9.9 8.9 - 12.9 FL  
 NRBC 1.3 (H) 0  WBC ABSOLUTE NRBC 0.13 (H) 0.00 - 0.01 K/uL NEUTROPHILS 92 (H) 32 - 75 % LYMPHOCYTES 3 (L) 12 - 49 % MONOCYTES 1 (L) 5 - 13 % EOSINOPHILS 0 0 - 7 % BASOPHILS 1 0 - 1 % IMMATURE GRANULOCYTES 3 (H) 0.0 - 0.5 % ABS. NEUTROPHILS 8.9 (H) 1.8 - 8.0 K/UL  
 ABS. LYMPHOCYTES 0.3 (L) 0.8 - 3.5 K/UL  
 ABS. MONOCYTES 0.1 0.0 - 1.0 K/UL  
 ABS. EOSINOPHILS 0.0 0.0 - 0.4 K/UL  
 ABS. BASOPHILS 0.1 0.0 - 0.1 K/UL  
 ABS. IMM. GRANS. 0.3 (H) 0.00 - 0.04 K/UL  
 DF SMEAR SCANNED    
 RBC COMMENTS ANISOCYTOSIS 1+ 
    
 RBC COMMENTS MACROCYTOSIS 1+ 
    
 RBC COMMENTS POLYCHROMASIA 1+ MAGNESIUM Collection Time: 04/17/21  2:34 AM  
Result Value Ref Range Magnesium 2.3 1.6 - 2.4 mg/dL PHOSPHORUS Collection Time: 04/17/21  2:34 AM  
Result Value Ref Range Phosphorus 6.8 (H) 2.6 - 4.7 MG/DL  
TROPONIN I Collection Time: 04/17/21  2:34 AM  
Result Value Ref Range Troponin-I, Qt. <0.05 <0.05 ng/mL TSH 3RD GENERATION Collection Time: 04/17/21  2:43 AM  
Result Value Ref Range TSH 3.56 0.36 - 3.74 uIU/mL HEMOGLOBIN A1C WITH EAG Collection Time: 04/17/21  2:43 AM  
Result Value Ref Range Hemoglobin A1c 7.3 (H) 4.0 - 5.6 % Est. average glucose 163 mg/dL GLUCOSE, POC Collection Time: 04/17/21  5:41 AM  
Result Value Ref Range Glucose (POC) 277 (H) 65 - 100 mg/dL Performed by Chaya Blanton GLUCOSE, POC Collection Time: 04/17/21  7:42 AM  
Result Value Ref Range Glucose (POC) 319 (H) 65 - 100 mg/dL Performed by Chaya Blanton GLUCOSE, POC Collection Time: 04/17/21 10:55 AM  
Result Value Ref Range Glucose (POC) 283 (H) 65 - 100 mg/dL  Performed by Devon Urbina Demetrio Ordaz Imaging: 
 
Ct Chest Wo Cont Result Date: 4/17/2021 EXAM:  CT CHEST WO CONT INDICATION:  Pneumonia COMPARISON: Yesterday, 1/13/2021. . TECHNIQUE: Unenhanced multislice helical CT was performed from the thoracic inlet to the adrenal glands without intravenous contrast administration. Contiguous 5 mm axial images were reconstructed and lung and soft tissue windows were generated. Coronal and sagittal reformations were generated. CT dose reduction was achieved through use of a standardized protocol tailored for this examination and automatic exposure control for dose modulation. Isma Jacobs FINDINGS: CHEST: Chest wall/thoracic inlet: Thyroid gland is heterogeneous. Thyroid: Within normal limits. Mediastinum/brianda: There are no pathologically enlarged mediastinal, hilar or axillary nodes. Heart/vessels: Heart is normal in size. There are coronary artery calcifications. Aortic arch is normal in caliber. There are vascular calcifications. Main pulmonary artery measures 3.6 cm Lungs/Pleura: Moderate right pleural effusion with partial collapse of the right middle and lower lobes. . . ABDOMEN: Incidentally imaged portions of the abdomen appear unremarkable. . MSK: Within normal limits. . 
 
1. Moderate right pleural effusion with partial collapse of the right middle and lower lobes 2. Dilated main pulmonary artery which can be seen with pulmonary artery hypertension. Xr Chest [facility] Result Date: 4/16/2021 EXAM: XR CHEST PORT HISTORY: hypoxia. COMPARISON: 1/13/2021 FINDINGS: Single view(s) of the chest. There is unchanged band of scarring in the mid right lung. There is an unchanged mild right pleural effusion there is increased consolidation in the medial right base. The left lung is clear. The heart is on the upper limits of normal for size. Next 1. Unchanged mild right pleural effusion. 2. Increased consolidation of the medial right base. 3. Unchanged scarring in the mid right lung.   
  
 
Assessment and Plan: Acute exacerbation of COPD 
-Continue prednisone and breathing treatment 
-Continue empiric antibiotics with ceftriaxone and azithromycin 
-Pulmonology evaluation Congestive heart failure 
-Acute congestive heart failure unknown systolic or diastolic 
-Previous echo with normal EF, order repeat echo 
-Diuresis with Lasix Pleural effusion 
-Right-sided pleural effusion with partial collapse of the middle right lower lobe 
-Pulmonology consulted -May need IR thoracentesis on Monday for fluid analysis Abnormal D-dimer 
-Further work-up with lower extremity Doppler and VQ scan pending Hyponatremia 
-Sodium level is 129 
-Free water restriction and continue IV Lasix -Nephrology following Hyperkalemia 
-Given Kayexalate per nephrology 
-Monitor electrolytes CKD stage IV 
-Creatinine somewhat elevated than her baseline 
-Nephrology following Hypertension 
-Continue amlodipine, clonidine and atenolol Diabetes 
-Continue insulin sliding scale coverage Dyslipidemia 
-Continue statin Discharge disposition: Likely more than 48 hours pending progress Signed By: Betito Vo MD   
 April 17, 2021

## 2021-04-17 NOTE — PROGRESS NOTES
TRANSFER - IN REPORT: 
 
Verbal report received from Abdirahman(name) on Aleatha Grade  being received from ED(unit) for routine progression of care Report consisted of patients Situation, Background, Assessment and  
Recommendations(SBAR). Information from the following report(s) SBAR, Kardex and MAR was reviewed with the receiving nurse. Opportunity for questions and clarification was provided. Assessment completed upon patients arrival to unit and care assumed.

## 2021-04-18 NOTE — PROGRESS NOTES
Bedside shift change report given to Akira Tian (oncoming nurse) by Jamie Preciado (offgoing nurse). Report included the following information SBAR, Kardex, MAR and Recent Results.

## 2021-04-18 NOTE — PROGRESS NOTES
Problem: Falls - Risk of 
Goal: *Absence of Falls Description: Document [place] Fall Risk and appropriate interventions in the flowsheet. Outcome: Progressing Towards Goal 
Note: Fall Risk Interventions: 
Mobility Interventions: Bed/chair exit alarm Mentation Interventions: Bed/chair exit alarm Medication Interventions: Bed/chair exit alarm Elimination Interventions: Call light in reach Problem: Pressure Injury - Risk of 
Goal: *Prevention of pressure injury Description: Document Nahid Scale and appropriate interventions in the flowsheet. Outcome: Progressing Towards Goal 
Note: Pressure Injury Interventions: 
Sensory Interventions: Maintain/enhance activity level Moisture Interventions: Maintain skin hydration (lotion/cream) Activity Interventions: PT/OT evaluation Mobility Interventions: PT/OT evaluation Nutrition Interventions: Document food/fluid/supplement intake Friction and Shear Interventions: Apply protective barrier, creams and emollients Problem: Breathing Pattern - Ineffective Goal: *Absence of hypoxia Outcome: Progressing Towards Goal 
Goal: *Use of effective breathing techniques Outcome: Progressing Towards Goal

## 2021-04-18 NOTE — PROGRESS NOTES
NAME: Carlos Washington :  1945 MRN:  [id number] Assessment   :                                               Plan: IDALIA on CKD stage 4 Hyperkalemia Hyponatremia HTN 
DM2 Hypoxia Creatinine worse today 2.4 to 3.1 (follows with Dr. Sidney Fleischer; baseline creatinine 2.2; CKD from DM/HTN/NSAIDS) 
  
H/o kidney mass? Not seen on a limited ultrasound ; Mild left-sided hydronephrosis - Urology consult - although I doubt left sided hydro is significant in her IDALIA Check urine sediment Check PVR on bladder scan 
  
K 6.6 to 5.4; Hold Lasix; holding losartan (last dose on ); add lokelma; low K diet 
  
Na 129 to 130 -- free water restrict Avoid nsaids completely  
  
 
Subjective: Chief Complaint:  [employer] and a poor historian - maybe a bit more so today than yesterday. No family in the room. Awoke without difficulty. No specific complaint Review of Systems: 
 
Symptom Y/N Comments  Symptom Y/N Comments Fever/Chills    Chest Pain Poor Appetite    Edema Cough    Abdominal Pain Sputum    Joint Pain SOB/BARNEY    Pruritis/Rash Nausea/vomit    Tolerating PT/OT Diarrhea    Tolerating Diet Constipation    Other Could not obtain due to: See above Objective: VITALS:  
Last 24hrs VS reviewed since prior progress note. Most recent are: 
Visit Vitals BP (!) 143/79 (BP 1 Location: Left upper arm, BP Patient Position: At rest) Pulse 91 Temp 97 °F (36.1 °C) Resp 20 Ht 5' 4\" (1.626 m) Wt 118.7 kg (261 lb 11.2 oz) SpO2 94% BMI 44.92 kg/m² Intake/Output Summary (Last 24 hours) at 2021 9195 Last data filed at 2021 2137 Gross per 24 hour Intake 150 ml Output  Net 150 ml Telemetry Reviewed: PHYSICAL EXAM: 
General: NAD Clear anterior No sydnie Lab Data Reviewed: (see below) Medications Reviewed: (see below) PMH/SH reviewed - no change compared to H&P 
________________________________________________________________________ Care Plan discussed with: 
Patient Family RN Care Manager Consultant:     
 
  Comments >50% of visit spent in counseling and coordination of care    
 
________________________________________________________________________ Freya Osler, MD  
 
Procedures: see electronic medical records for all procedures/Xrays and details which 
were not copied into this note but were reviewed prior to creation of Plan. LABS: 
Recent Labs 04/18/21 
1338 04/17/21 
0234 WBC 11.2* 9.7 HGB 12.3 14.1 HCT 44.2 49.5*  192 Recent Labs 04/17/21 
0234 04/16/21 
1652 * 131* K 6.6* 5.2*  
CL 94* 95* CO2 29 33* BUN 45* 44* CREA 2.42* 2.26* * 226* CA 8.8 8.9 MG 2.3  --   
PHOS 6.8*  --   
 
Recent Labs 04/17/21 0234 04/16/21 
1652 * 137* TP 7.1 7.0 ALB 3.1* 3.2*  
GLOB 4.0 3.8 No results for input(s): INR, PTP, APTT, INREXT in the last 72 hours. No results for input(s): FE, TIBC, PSAT, FERR in the last 72 hours. No results found for: FOL, RBCF No results for input(s): PH, PCO2, PO2 in the last 72 hours. No results for input(s): CPK, CKMB in the last 72 hours. No lab exists for component: TROPONINI No components found for: [place] No results found for: COLOR, APPRN, SPGRU, REFSG, AMANDA, PROTU, GLUCU, KETU, BILU, UROU, OLIVER, LEUKU, GLUKE, EPSU, BACTU, WBCU, RBCU, CASTS, UCRY MEDICATIONS: 
Current Facility-Administered Medications Medication Dose Route Frequency  dextrose (D50W) injection syrg 12.5-25 g  12.5-25 g IntraVENous PRN  
 albuterol-ipratropium (DUO-NEB) 2.5 MG-0.5 MG/3 ML  3 mL Nebulization Q4H PRN  
 allopurinoL (ZYLOPRIM) tablet 300 mg  300 mg Oral DAILY  amLODIPine (NORVASC) tablet 5 mg  5 mg Oral DAILY  aspirin delayed-release tablet 81 mg  81 mg Oral DAILY  atenoloL (TENORMIN) tablet 25 mg  25 mg Oral DAILY  cetirizine (ZYRTEC) tablet 10 mg  10 mg Oral DAILY  cloNIDine HCL (CATAPRES) tablet 0.2 mg  0.2 mg Oral BID  doxazosin (CARDURA) tablet 1 mg  1 mg Oral DAILY  DULoxetine (CYMBALTA) capsule 60 mg  60 mg Oral DAILY  furosemide (LASIX) injection 40 mg  40 mg IntraVENous DAILY  [Held by provider] losartan (COZAAR) tablet 100 mg  100 mg Oral DAILY  rosuvastatin (CRESTOR) tablet 5 mg  5 mg Oral EVERY OTHER DAY  sodium chloride (NS) flush 5-40 mL  5-40 mL IntraVENous Q8H  
 sodium chloride (NS) flush 5-40 mL  5-40 mL IntraVENous PRN  
 acetaminophen (TYLENOL) tablet 650 mg  650 mg Oral Q6H PRN Or  
 acetaminophen (TYLENOL) suppository 650 mg  650 mg Rectal Q6H PRN  polyethylene glycol (MIRALAX) packet 17 g  17 g Oral DAILY PRN  promethazine (PHENERGAN) tablet 12.5 mg  12.5 mg Oral Q6H PRN Or  
 ondansetron (ZOFRAN) injection 4 mg  4 mg IntraVENous Q6H PRN  
 heparin (porcine) injection 5,000 Units  5,000 Units SubCUTAneous Q8H  
 L.acidophilus-paracasei-S.thermophil-bifidobacter (RISAQUAD) 8 billion cell capsule  1 Cap Oral DAILY  predniSONE (DELTASONE) tablet 40 mg  40 mg Oral DAILY WITH BREAKFAST  azithromycin (ZITHROMAX) 500 mg in 0.9% sodium chloride 250 mL (VIAL-MATE)  500 mg IntraVENous Q24H  cefTRIAXone (ROCEPHIN) 1 g in 0.9% sodium chloride (MBP/ADV) 50 mL MBP  1 g IntraVENous Q24H  
 insulin lispro (HUMALOG) injection   SubCUTAneous AC&HS  
 glucose chewable tablet 16 g  4 Tab Oral PRN  
 dextrose (D50W) injection syrg 12.5-25 g  12.5-25 g IntraVENous PRN  
 glucagon (GLUCAGEN) injection 1 mg  1 mg IntraMUSCular PRN

## 2021-04-18 NOTE — CONSULTS
Pulmonology Consult - [facility] Subjective:  
Consult Note: 4/18/2021 9:38 AM 
 
This patient has been seen and evaluated at the request of Dr. Tomasa Haywood for R pleural effusion. Patient is a 66 y.o. female Admitted for worsening shortness of breath over the previous weeks increasing past days Chest xray with sizeable R effusion increased since 1/2021 CT 4/17 also with increased effusion compared with CT 1/2021 Recent covid infection No recent fever chills or sweats Also with obesity and CRF Has PFT on record with R effusion demonstrating restrictive changes Maintained on trelegy at home Smoked a pack a day for 20 yrs none for 20 years Past Medical History:  
Diagnosis Date  COPD (chronic obstructive pulmonary disease) ([facility])  Diabetes ([facility] [address] History reviewed. No pertinent surgical history. Prior to Admission medications Medication Sig Start Date End Date Taking? Authorizing Provider  
albuterol (PROVENTIL VENTOLIN) 2.5 mg /3 mL (0.083 %) nebu USE 1 VIAL IN NEBULIZER EVERY 4 HOURS AS NEEDED 9/26/20   Provider, Historical  
albuterol (PROVENTIL HFA, VENTOLIN HFA, PROAIR HFA) 90 mcg/actuation inhaler  10/3/20   Provider, Historical  
Trelegy Ellipta 100-62.5-25 mcg inhaler INHALE 1 PUFF ONCE DAILY 9/27/20   Provider, Historical  
ONETOUCH ULTRA BLUE TEST STRIP strip USE 1 STRIP TO CHECK GLUCOSE THREE TIMES DAILY FOR 90 DAYS 8/6/19   Provider, Historical  
losartan (COZAAR) 100 mg tablet  3/17/19   Provider, Historical  
furosemide (LASIX) 40 mg tablet Take  by mouth daily. Provider, Historical  
cyanocobalamin (VITAMIN B-12) 500 mcg tablet Take 500 mcg by mouth daily. Provider, Historical  
glimepiride (AMARYL) 4 mg tablet Take  by mouth every morning. Provider, Historical  
cetirizine (ZYRTEC) 10 mg tablet Take  by mouth daily. Provider, Historical  
doxazosin (CARDURA) 2 mg tablet Take 1 mg by mouth daily.     Provider, Historical  
DULoxetine (CYMBALTA) 60 mg capsule Take 60 mg by mouth daily. Provider, Historical  
amLODIPine (NORVASC) 5 mg tablet Take 5 mg by mouth daily. Provider, Historical  
allopurinol (ZYLOPRIM) 300 mg tablet Take  by mouth daily. Provider, Historical  
cloNIDine HCl (CATAPRES) 0.2 mg tablet Take  by mouth two (2) times a day. Provider, Historical  
atenolol (TENORMIN) 25 mg tablet Take 25 mg by mouth daily. Provider, Historical  
cholecalciferol, VITAMIN D3, (VITAMIN D3) 5,000 unit tab tablet Take  by mouth daily. Provider, Historical  
aspirin delayed-release 81 mg tablet Take  by mouth daily. Provider, Historical  
irbesartan (AVAPRO) 300 mg tablet Take 300 mg by mouth nightly. Provider, Historical  
umeclidinium-vilanterol (ANORO ELLIPTA) 62.5-25 mcg/actuation inhaler Take 1 Puff by inhalation daily. Provider, Historical  
insulin NPH/insulin regular (NOVOLIN 70/30 U-100 INSULIN) 100 unit/mL (70-30) injection 60 Units by SubCUTAneous route nightly. Provider, Historical  
rosuvastatin (CRESTOR) 5 mg tablet Take 1 Tab by mouth every other day. 9/13/18   Leonel Wheatley MD  
 
Allergies Allergen Reactions  Ibuprofen Itching Due to kidney failure, cant take any NSAID  Codeine Itching Social History Tobacco Use  Smoking status: Former Smoker  Smokeless tobacco: Never Used Substance Use Topics  Alcohol use: Not Currently Comment: rarely History reviewed. No pertinent family history. Current Facility-Administered Medications Medication Dose Route Frequency  sodium zirconium cyclosilicate (LOKELMA) powder packet 5 g  5 g Oral DAILY  dextrose (D50W) injection syrg 12.5-25 g  12.5-25 g IntraVENous PRN  
 albuterol-ipratropium (DUO-NEB) 2.5 MG-0.5 MG/3 ML  3 mL Nebulization Q4H PRN  
 allopurinoL (ZYLOPRIM) tablet 300 mg  300 mg Oral DAILY  amLODIPine (NORVASC) tablet 5 mg  5 mg Oral DAILY  aspirin delayed-release tablet 81 mg  81 mg Oral DAILY  atenoloL (TENORMIN) tablet 25 mg  25 mg Oral DAILY  cetirizine (ZYRTEC) tablet 10 mg  10 mg Oral DAILY  cloNIDine HCL (CATAPRES) tablet 0.2 mg  0.2 mg Oral BID  doxazosin (CARDURA) tablet 1 mg  1 mg Oral DAILY  DULoxetine (CYMBALTA) capsule 60 mg  60 mg Oral DAILY  [Held by provider] furosemide (LASIX) injection 40 mg  40 mg IntraVENous DAILY  [Held by provider] losartan (COZAAR) tablet 100 mg  100 mg Oral DAILY  rosuvastatin (CRESTOR) tablet 5 mg  5 mg Oral EVERY OTHER DAY  sodium chloride (NS) flush 5-40 mL  5-40 mL IntraVENous Q8H  
 sodium chloride (NS) flush 5-40 mL  5-40 mL IntraVENous PRN  
 acetaminophen (TYLENOL) tablet 650 mg  650 mg Oral Q6H PRN Or  
 acetaminophen (TYLENOL) suppository 650 mg  650 mg Rectal Q6H PRN  polyethylene glycol (MIRALAX) packet 17 g  17 g Oral DAILY PRN  promethazine (PHENERGAN) tablet 12.5 mg  12.5 mg Oral Q6H PRN Or  
 ondansetron (ZOFRAN) injection 4 mg  4 mg IntraVENous Q6H PRN  
 heparin (porcine) injection 5,000 Units  5,000 Units SubCUTAneous Q8H  
 L.acidophilus-paracasei-S.thermophil-bifidobacter (RISAQUAD) 8 billion cell capsule  1 Cap Oral DAILY  predniSONE (DELTASONE) tablet 40 mg  40 mg Oral DAILY WITH BREAKFAST  azithromycin (ZITHROMAX) 500 mg in 0.9% sodium chloride 250 mL (VIAL-MATE)  500 mg IntraVENous Q24H  cefTRIAXone (ROCEPHIN) 1 g in 0.9% sodium chloride (MBP/ADV) 50 mL MBP  1 g IntraVENous Q24H  
 insulin lispro (HUMALOG) injection   SubCUTAneous AC&HS  
 glucose chewable tablet 16 g  4 Tab Oral PRN  
 dextrose (D50W) injection syrg 12.5-25 g  12.5-25 g IntraVENous PRN  
 glucagon (GLUCAGEN) injection 1 mg  1 mg IntraMUSCular PRN Review of Systems: 
Constitutional HEENT Cardiovascular Pulmonary Gastrointestinal Genitourinary Musculoskeletal Integument Neurologic Endocrinologic Hematologic ROS unremarkable except for HPI Objective:  
Vital Signs:   
Visit Vitals BP 97/67 (BP 1 Location: Right leg, BP Patient Position: At rest) Pulse 90 Temp 96.9 °F (36.1 °C) Resp 20 Ht 5' 4\" (1.626 m) Wt 118.7 kg (261 lb 11.2 oz) SpO2 95% BMI 44.92 kg/m² O2 Device: Nasal cannula O2 Flow Rate (L/min): 4 l/min Temp (24hrs), Av °F (36.1 °C), Min:96.2 °F (35.7 °C), Max:97.6 °F (36.4 °C) Intake/Output:  
Last shift:      No intake/output data recorded. Last 3 shifts:  1901 -  0700 In: 300 [P.O.:150; I.V.:150] Out: - Intake/Output Summary (Last 24 hours) at 2021 1475 Last data filed at 2021 2137 Gross per 24 hour Intake 150 ml Output  Net 150 ml Physical Exam:  
General:  Alert, cooperative, no distress, . Head:  Normocephalic, atraumatic without obvious abnormality Eyes:  Sclera non injected Conjunctivae. PERRL, EOMs intact. Nose: Nares normal. Septum midline. Mucosa normal. No drainage or sinus tenderness. Mouth: Moist mucus membranes  Dentition Neck: Supple, symmetrical, trachea midline, no adenopathy,   No stridor Back:   Without CVA or spinal tenderness Lungs:   acessory muscle use wheeze rhonchi rales Heart:  Regular rate and rhythm, S1, S2 normal, no murmur, click, rub or gallop. Abdomen:   Soft, non-tender. Bowel sounds normal. No masses, tenderness, guarding rebound No organomegaly. Extremities: no cyanosis  edema clubbing Skin: Warm dry. No rashes or lesions Lymph nodes: Cervical, supraclavicular, and axillary nodes normal.  
Neurologic: A/o No gross deficits Data Review Recent Results (from the past 24 hour(s)) GLUCOSE, POC Collection Time: 21 10:55 AM  
Result Value Ref Range Glucose (POC) 283 (H) 65 - 100 mg/dL Performed by Radha Mtz GLUCOSE, POC Collection Time: 21  5:09 PM  
Result Value Ref Range Glucose (POC) 213 (H) 65 - 100 mg/dL Performed by Timothy Davis GLUCOSE, POC Collection Time: 21  9:01 PM  
Result Value Ref Range  Glucose (POC) 216 (H) 65 - 100 mg/dL Performed by Theodore Hernandez METABOLIC PANEL, BASIC Collection Time: 04/18/21  6:29 AM  
Result Value Ref Range Sodium 130 (L) 136 - 145 mmol/L Potassium 5.4 (H) 3.5 - 5.1 mmol/L Chloride 93 (L) 97 - 108 mmol/L  
 CO2 32 21 - 32 mmol/L Anion gap 5 5 - 15 mmol/L Glucose 198 (H) 65 - 100 mg/dL BUN 57 (H) 6 - 20 MG/DL Creatinine 3.08 (H) 0.55 - 1.02 MG/DL  
 BUN/Creatinine ratio 19 12 - 20 GFR est AA 18 (L) >60 ml/min/1.73m2 GFR est non-AA 15 (L) >60 ml/min/1.73m2 Calcium 7.9 (L) 8.5 - 10.1 MG/DL  
CBC W/O DIFF Collection Time: 04/18/21  6:29 AM  
Result Value Ref Range WBC 11.2 (H) 3.6 - 11.0 K/uL  
 RBC 3.93 3.80 - 5.20 M/uL  
 HGB 12.3 11.5 - 16.0 g/dL HCT 44.2 35.0 - 47.0 % .5 (H) 80.0 - 99.0 FL  
 MCH 31.3 26.0 - 34.0 PG  
 MCHC 27.8 (L) 30.0 - 36.5 g/dL  
 RDW 16.8 (H) 11.5 - 14.5 % PLATELET 043 896 - 565 K/uL MPV 9.8 8.9 - 12.9 FL  
 NRBC 1.2 (H) 0  WBC ABSOLUTE NRBC 0.14 (H) 0.00 - 0.01 K/uL PHOSPHORUS Collection Time: 04/18/21  6:29 AM  
Result Value Ref Range Phosphorus 8.3 (H) 2.6 - 4.7 MG/DL MAGNESIUM Collection Time: 04/18/21  6:29 AM  
Result Value Ref Range Magnesium 2.3 1.6 - 2.4 mg/dL CK Collection Time: 04/18/21  6:29 AM  
Result Value Ref Range  (H) 26 - 192 U/L  
GLUCOSE, POC Collection Time: 04/18/21  6:57 AM  
Result Value Ref Range Glucose (POC) 222 (H) 65 - 100 mg/dL Performed by Joan Dominique Chest X-Ray:Larg R effusion CM 
CT large R effusion compressive atelectasis RML RLL Assessment:  
Principal Problem: 
  Acute respiratory failure with hypoxia ([address] (4/16/2021) Large R pleural effusion increased since CT 1/2021 Acute on chronic respiratory failure multifactorial in etiology and at least in part related to R effusion , compressive atelectasis, obesity, COPD by hx Tob hx CRI 
· Recommendations: 1.  Diagnostic and therapeutic thoracentesis 2. Discontinue antibiotics if the is no strong evidence of infection 3. DVT ppx 4. Further recommendations to follow pending results of thoracentesis Donny Duran MD

## 2021-04-18 NOTE — PROGRESS NOTES
Bedside and Verbal shift change report given to Mari Chaparro (oncoming nurse) by Belinda Snow RN (offgoing nurse). Report included the following information SBAR, Kardex, MAR and Recent Results.

## 2021-04-18 NOTE — CONSULTS
Urology Consult Principal Problem: 
  Acute respiratory failure with hypoxia ([address] (4/16/2021) Admitting MD (and procedure) = Lacey Banegas MD -   
\[facility] [place] [facility]\"" UROLOGIST: DESTINY Primary care MD: Micaela Fu MD  - [phone] Code state: Full Code 
 
____________________________________________________________________________ ASSESSMENT/PLAN:  
Significant medical comorbidities admitted with increased renal dysfunction. Ultrasound demonstrating concerns possible left mild hydronephrosis. Nephrology following. IDALIA on CKDas noted per nephrology. Multifactorial contribution. Nonetheless, ordering noncontrast CT A/P for today to ensure no stone or other. Interventions planned -none emergentlysubject to change with CT result Diet plan-she may eat at this time 
 
 
____________________________________________________________________________ SUBJECTIVE: 
Date of Consultation:  April 18, 2021 Referring Physician: Lacey Banegas MD 
Reason for Consultation:  Left hydro, IDALIA on CKD Code state: Full Code History of Present Illness:  
66y.o. year old female - She was admitted to the hospital for Acute respiratory failure with hypoxia ([address] [J96.01]. Established patient Dr. Gloria Lyn with history of renal dysfunction and possible renal mass in the past.  Patient with longstanding voiding issues. High risk for any intervention options. I am asked to see patient for increased creatinine from baseline of approximately 2-2.2 up to 3.1. She has had this happen before. She is a poor historian. Review of available notes from our system including notes from nephrology. Notably she denies any pain, but again is a bit limited as a historian. Past Medical History:  
Diagnosis Date  COPD (chronic obstructive pulmonary disease) ([facility])  Diabetes ([address] History reviewed. No pertinent surgical history. History reviewed. No pertinent family history. Social History Tobacco Use  Smoking status: Former Smoker  Smokeless tobacco: Never Used Substance Use Topics  Alcohol use: Not Currently Comment: rarely Allergies Allergen Reactions  Ibuprofen Itching Due to kidney failure, cant take any NSAID  Codeine Itching Prior to Admission medications Medication Sig Start Date End Date Taking? Authorizing Provider  
albuterol (PROVENTIL VENTOLIN) 2.5 mg /3 mL (0.083 %) nebu USE 1 VIAL IN NEBULIZER EVERY 4 HOURS AS NEEDED 9/26/20   Provider, Historical  
albuterol (PROVENTIL HFA, VENTOLIN HFA, PROAIR HFA) 90 mcg/actuation inhaler  10/3/20   Provider, Historical  
Trelegy Ellipta 100-62.5-25 mcg inhaler INHALE 1 PUFF ONCE DAILY 9/27/20   Provider, Historical  
ONETOUCH ULTRA BLUE TEST STRIP strip USE 1 STRIP TO CHECK GLUCOSE THREE TIMES DAILY FOR 90 DAYS 8/6/19   Provider, Historical  
losartan (COZAAR) 100 mg tablet  3/17/19   Provider, Historical  
furosemide (LASIX) 40 mg tablet Take  by mouth daily. Provider, Historical  
cyanocobalamin (VITAMIN B-12) 500 mcg tablet Take 500 mcg by mouth daily. Provider, Historical  
glimepiride (AMARYL) 4 mg tablet Take  by mouth every morning. Provider, Historical  
cetirizine (ZYRTEC) 10 mg tablet Take  by mouth daily. Provider, Historical  
doxazosin (CARDURA) 2 mg tablet Take 1 mg by mouth daily. Provider, Historical  
DULoxetine (CYMBALTA) 60 mg capsule Take 60 mg by mouth daily. Provider, Historical  
amLODIPine (NORVASC) 5 mg tablet Take 5 mg by mouth daily. Provider, Historical  
allopurinol (ZYLOPRIM) 300 mg tablet Take  by mouth daily. Provider, Historical  
cloNIDine HCl (CATAPRES) 0.2 mg tablet Take  by mouth two (2) times a day. Provider, Historical  
atenolol (TENORMIN) 25 mg tablet Take 25 mg by mouth daily. Provider, Historical  
cholecalciferol, VITAMIN D3, (VITAMIN D3) 5,000 unit tab tablet Take  by mouth daily.     Provider, Historical  
aspirin delayed-release 81 mg tablet Take  by mouth daily. Provider, Historical  
irbesartan (AVAPRO) 300 mg tablet Take 300 mg by mouth nightly. Provider, Historical  
umeclidinium-vilanterol (ANORO ELLIPTA) 62.5-25 mcg/actuation inhaler Take 1 Puff by inhalation daily. Provider, Historical  
insulin NPH/insulin regular (NOVOLIN 70/30 U-100 INSULIN) 100 unit/mL (70-30) injection 60 Units by SubCUTAneous route nightly. Provider, Historical  
rosuvastatin (CRESTOR) 5 mg tablet Take 1 Tab by mouth every other day. 18   Garret Herrera MD  
 
 
 
Review of Systems: (positive-underlined) Unexplained weight loss, Dry eyes, Dry mouth, Chest pain, SOB, Constipation, Extremity weakness, Pallor, TIA symptoms, Confusion, Easy bruising OBJECTIVE: 
Patient Vitals for the past 8 hrs: 
 BP Temp Pulse Resp SpO2  
21 1158 96/62 98 °F (36.7 °C) 94 20 93 % 21 0842 97/67 96.9 °F (36.1 °C) 90 20 95 % 21 0822     95 % 21 0500 (!) 143/79 97 °F (36.1 °C) 91 20 94 % Temp (24hrs), Av.2 °F (36.2 °C), Min:96.6 °F (35.9 °C), Max:98 °F (36.7 °C) Intake and Output:  
 1901 -  0700 In: 300 [P.O.:150; I.V.:150] Out: - Physical Exam: 
 
Appearance: Well-developed no acute distress Conjunctiva: WNL Pupil/iris: WNL External ears/nose: Normal no lesions or deformities Hearing: WNL Neck: Supple without masses Thyroid: WNL Respiratory effort: Breathing easily Chest palpation: No tactile fremitus Aortic: No enlargement or bruits Lower extremity: No edema Abdomen/flank: Soft nontender without masses no CVA tenderness Liver/spleen: No organomegaly Hernia: No ing/ventral hernia Lymph: No adenopathy Digits/nails: No clubbing cyanosis or petechiae Skin inspection: Warm and dry Skin palpation: No subcutaneous nodularity Sensation: No sensory deficits Judgment/Insight: intact Mood/Affect: normal 
 
Recent Results (from the past 24 hour(s)) GLUCOSE, POC  Collection Time: 21  5:09 PM Result Value Ref Range Glucose (POC) 213 (H) 65 - 100 mg/dL Performed by Felipa Palacios GLUCOSE, POC Collection Time: 21  9:01 PM  
Result Value Ref Range Glucose (POC) 216 (H) 65 - 100 mg/dL Performed by Felipa Palacios METABOLIC PANEL, BASIC Collection Time: 21  6:29 AM  
Result Value Ref Range Sodium 130 (L) 136 - 145 mmol/L Potassium 5.4 (H) 3.5 - 5.1 mmol/L Chloride 93 (L) 97 - 108 mmol/L  
 CO2 32 21 - 32 mmol/L Anion gap 5 5 - 15 mmol/L Glucose 198 (H) 65 - 100 mg/dL BUN 57 (H) 6 - 20 MG/DL Creatinine 3.08 (H) 0.55 - 1.02 MG/DL  
 BUN/Creatinine ratio 19 12 - 20 GFR est AA 18 (L) >60 ml/min/1.73m2 GFR est non-AA 15 (L) >60 ml/min/1.73m2 Calcium 7.9 (L) 8.5 - 10.1 MG/DL  
CBC W/O DIFF Collection Time: 21  6:29 AM  
Result Value Ref Range WBC 11.2 (H) 3.6 - 11.0 K/uL  
 RBC 3.93 3.80 - 5.20 M/uL  
 HGB 12.3 11.5 - 16.0 g/dL HCT 44.2 35.0 - 47.0 % .5 (H) 80.0 - 99.0 FL  
 MCH 31.3 26.0 - 34.0 PG  
 MCHC 27.8 (L) 30.0 - 36.5 g/dL  
 RDW 16.8 (H) 11.5 - 14.5 % PLATELET 139 705 - 138 K/uL MPV 9.8 8.9 - 12.9 FL  
 NRBC 1.2 (H) 0  WBC ABSOLUTE NRBC 0.14 (H) 0.00 - 0.01 K/uL PHOSPHORUS Collection Time: 21  6:29 AM  
Result Value Ref Range Phosphorus 8.3 (H) 2.6 - 4.7 MG/DL MAGNESIUM Collection Time: 21  6:29 AM  
Result Value Ref Range Magnesium 2.3 1.6 - 2.4 mg/dL CK Collection Time: 21  6:29 AM  
Result Value Ref Range  (H) 26 - 192 U/L  
GLUCOSE, POC Collection Time: 21  6:57 AM  
Result Value Ref Range Glucose (POC) 222 (H) 65 - 100 mg/dL Performed by Susan  URINALYSIS W/ REFLEX CULTURE Collection Time: 21 10:40 AM  
 Specimen: Urine Result Value Ref Range Color YELLOW/STRAW Appearance CLOUDY (A) CLEAR Specific gravity 1.017 1.003 - 1.030    
 pH (UA) 5.0 5.0 - 8.0 Protein 300 (A) NEG mg/dL  Glucose 250 (A) NEG mg/dL Ketone Negative NEG mg/dL Bilirubin Negative NEG Blood MODERATE (A) NEG Urobilinogen 1.0 0.2 - 1.0 EU/dL Nitrites Negative NEG Leukocyte Esterase Negative NEG    
 WBC 0-4 0 - 4 /hpf  
 RBC 0-5 0 - 5 /hpf Epithelial cells FEW FEW /lpf Bacteria Negative NEG /hpf  
 UA:UC IF INDICATED CULTURE NOT INDICATED BY UA RESULT CNI Amorphous Crystals 1+ (A) NEG  
GLUCOSE, POC Collection Time: 04/18/21 12:02 PM  
Result Value Ref Range Glucose (POC) 222 (H) 65 - 100 mg/dL Performed by Shonda Oliva Current Antimicrobial Therapy (168h ago, onward) Ordered     Start Stop  
 04/16/21 2351  azithromycin (ZITHROMAX) 500 mg in 0.9% sodium chloride 250 mL (VIAL-MATE)  500 mg,   IntraVENous,   EVERY 24 HOURS    
 04/17/21 0100 04/22/21 0059 04/16/21 2351  cefTRIAXone (ROCEPHIN) 1 g in 0.9% sodium chloride (MBP/ADV) 50 mL MBP  1 g,   IntraVENous,   EVERY 24 HOURS    
 04/17/21 0100 04/22/21 0059 Cultures: All Micro Results None Signed By: Olive James MD  
                      April 18, 2021

## 2021-04-18 NOTE — PROGRESS NOTES
[address] [facility] Adult  Hospitalist Group Hospitalist Progress Note Yolanda Garcia MD 
Answering service: [phone] OR [phone] from in house phone Progress Note Patient: Cathy Chamberlain MRN: [id number]  SSN: [id number] YOB: 1945  Age: 66 y.o. Sex: female Admit Date: 4/16/2021 LOS: 2 days Subjective:  
 
Patient presents with COPD exacerbation as well as CHF and possible pneumonia. Has been coughing. Requiring supplemental oxygen of 2 L. At baseline patient does not use oxygen. Objective:  
 
Vitals:  
 04/18/21 0500 04/18/21 1100 04/18/21 0842 04/18/21 1158 BP: (!) 143/79  97/67 96/62 Pulse: 91  90 94 Resp: 20  20 20 Temp: 97 °F (36.1 °C)  96.9 °F (36.1 °C) 98 °F (36.7 °C) SpO2: 94% 95% 95% 93% Weight:      
Height:      
  
 
Intake and Output: 
Current Shift: No intake/output data recorded. Last three shifts: 04/16 1901 - 04/18 0700 In: 300 [P.O.:150; I.V.:150] Out: - Physical Exam:  
GENERAL: alert, cooperative, no distress, appears stated age THROAT & NECK: normal and no erythema or exudates noted. LUNG: clear to auscultation bilaterally HEART: regular rate and rhythm, S1, S2 normal, no murmur, click, rub or gallop ABDOMEN: soft, non-tender. Bowel sounds normal. No masses,  no organomegaly EXTREMITIES:  extremities normal, atraumatic, no cyanosis or edema SKIN: no rash or abnormalities NEUROLOGIC: AOx3. PSYCHIATRIC: non focal 
 
Lab/Data Review: All lab results for the last 24 hours reviewed. Recent Results (from the past 24 hour(s)) GLUCOSE, POC Collection Time: 04/17/21  5:09 PM  
Result Value Ref Range Glucose (POC) 213 (H) 65 - 100 mg/dL Performed by Jany Muniz GLUCOSE, POC Collection Time: 04/17/21  9:01 PM  
Result Value Ref Range Glucose (POC) 216 (H) 65 - 100 mg/dL  Performed by Jany Muniz METABOLIC PANEL, BASIC Collection Time: 04/18/21  6:29 AM  
Result Value Ref Range Sodium 130 (L) 136 - 145 mmol/L Potassium 5.4 (H) 3.5 - 5.1 mmol/L Chloride 93 (L) 97 - 108 mmol/L  
 CO2 32 21 - 32 mmol/L Anion gap 5 5 - 15 mmol/L Glucose 198 (H) 65 - 100 mg/dL BUN 57 (H) 6 - 20 MG/DL Creatinine 3.08 (H) 0.55 - 1.02 MG/DL  
 BUN/Creatinine ratio 19 12 - 20 GFR est AA 18 (L) >60 ml/min/1.73m2 GFR est non-AA 15 (L) >60 ml/min/1.73m2 Calcium 7.9 (L) 8.5 - 10.1 MG/DL  
CBC W/O DIFF Collection Time: 04/18/21  6:29 AM  
Result Value Ref Range WBC 11.2 (H) 3.6 - 11.0 K/uL  
 RBC 3.93 3.80 - 5.20 M/uL  
 HGB 12.3 11.5 - 16.0 g/dL HCT 44.2 35.0 - 47.0 % .5 (H) 80.0 - 99.0 FL  
 MCH 31.3 26.0 - 34.0 PG  
 MCHC 27.8 (L) 30.0 - 36.5 g/dL  
 RDW 16.8 (H) 11.5 - 14.5 % PLATELET 964 002 - 636 K/uL MPV 9.8 8.9 - 12.9 FL  
 NRBC 1.2 (H) 0  WBC ABSOLUTE NRBC 0.14 (H) 0.00 - 0.01 K/uL PHOSPHORUS Collection Time: 04/18/21  6:29 AM  
Result Value Ref Range Phosphorus 8.3 (H) 2.6 - 4.7 MG/DL MAGNESIUM Collection Time: 04/18/21  6:29 AM  
Result Value Ref Range Magnesium 2.3 1.6 - 2.4 mg/dL CK Collection Time: 04/18/21  6:29 AM  
Result Value Ref Range  (H) 26 - 192 U/L  
GLUCOSE, POC Collection Time: 04/18/21  6:57 AM  
Result Value Ref Range Glucose (POC) 222 (H) 65 - 100 mg/dL Performed by Jeanette [place] URINALYSIS W/ REFLEX CULTURE Collection Time: 04/18/21 10:40 AM  
 Specimen: Urine Result Value Ref Range Color YELLOW/STRAW Appearance CLOUDY (A) CLEAR Specific gravity 1.017 1.003 - 1.030    
 pH (UA) 5.0 5.0 - 8.0 Protein 300 (A) NEG mg/dL Glucose 250 (A) NEG mg/dL Ketone Negative NEG mg/dL Bilirubin Negative NEG Blood MODERATE (A) NEG Urobilinogen 1.0 0.2 - 1.0 EU/dL Nitrites Negative NEG  Leukocyte Esterase Negative NEG    
 WBC 0-4 0 - 4 /hpf  
 RBC 0-5 0 - 5 /hpf  
 Epithelial cells FEW FEW /lpf Bacteria Negative NEG /hpf  
 UA:UC IF INDICATED CULTURE NOT INDICATED BY UA RESULT CNI Amorphous Crystals 1+ (A) NEG  
GLUCOSE, POC Collection Time: 04/18/21 12:02 PM  
Result Value Ref Range Glucose (POC) 222 (H) 65 - 100 mg/dL Performed by Fawn Rosales Imaging: 
 
Us Retroperitoneum Comp Result Date: 4/17/2021 EXAM:  US RETROPERITONEUM COMP INDICATION:  r/o obstruction, evaluate kidney mass COMPARISON: None. TECHNIQUE: Real-time sonography of the kidneys, retroperitoneum and bladder was performed with multiple static images obtained. Study limited by body habitus FINDINGS: Right kidney measures 13.1 cm. Left kidney measures 11.7 cm. Evaluation for mass is limited. Kidneys are mildly echogenic. Mild left-sided hydronephrosis Aorta and iliac vessels are poorly visualized. The visualized portions of the proximal aorta are normal.  The IVC is normal. No retroperitoneal mass is identified. The urinary bladder is incompletely distended Limited exam. Echogenic kidneys which can be seen with medical renal disease. Mild left-sided hydronephrosis Assessment and Plan:  
 
Acute exacerbation of COPD 
-Continue prednisone and breathing treatment 
-Continue empiric antibiotics with ceftriaxone and azithromycin 
-Pulmonology evaluation Congestive heart failure 
-Acute congestive heart failure unknown systolic or diastolic 
-Previous echo with normal EF, repeat echocardiogram pending 
-Diuresis with Lasix Pleural effusion 
-Right-sided pleural effusion with partial collapse of the middle right lower lobe 
-Pulmonology consulted 
-Plan for IR thoracentesis tomorrow with pleural fluid analysis including cell count, culture, LDH and protein Abnormal D-dimer 
-Further work-up with lower extremity Doppler and VQ scan pending Hyponatremia 
-Free water restriction and continue IV Lasix -Nephrology following Hyperkalemia 
-Patient received Kayexalate 4/17 with improving sodium level 
-Monitor electrolytes CKD stage IV 
-Creatinine trending up -Nephrology following Hypertension 
-Continue amlodipine, clonidine and atenolol Diabetes 
-Continue insulin sliding scale coverage Dyslipidemia 
-Continue statin Discharge disposition: Likely more than 48 hours pending progress. Signed By: Zoila Henry MD   
 April 18, 2021

## 2021-04-18 NOTE — CONSULTS
[place]: Valentino Flasher 
[phone] Reason for hospitalization: Increasing dyspnea Reason for consult: Possible congestive HF Assessment and Plan: 1. Coronary calcification with 2. COPD. 3. Essential hypertension. Blood pressure is controlled. 4. Chronic kidney disease, stage 4. Followed by nephrology, Dr. Field Early with IDALIA 5. Palpitations. Occasional PACs. 6. Mixed hyperlipidemia. 7. Morbid obesity. 8. History of tobacco use. 9. Pleural effusion of uncertain etiology 10. Acute hypoxemia respiratory failure likely secondary to acute exacerbation of COPD, some HFpEF and pleural effusion. 11. Hyperkalemia. Probably mild HF with underlying COPD with acute exacerbation. Pleural effusion contributing to symptoms and may be secondary to DHF. Attempted Diuretics--creatinine bumped. Hydronephrosis. Will defer retrial to nephrology. Consider thoracentesis given increasing pleural effusion for symptomatic relief as well as diagnostic analysis. Hold ACEi/ARB---probably not a good choice for future. Avoid NSAIDs. Investigation reviewed:  
Stress test in November 2020 was negative for ischemia. Normal LVEF Echo November 2020: Normal EF, mildly dilated RV, mild AS. ECG: April 2021: Low voltage, SR History of Present Illness:   Ms. Brenton Fitch is a 67 yo F with a history of CKD stage 4 with baseline creatinine of 1.8-2.2, DM2 and long NSAID use, secondary hyperparathyroidism, history of CVA in 2013 and subsequent CEA on the left side in 2013, history of knee replacement in 2010, history of tobacco use quit many years ago. Abdominal CT scan incidentally noted calcification in the coronary arteries. Prior echo was normal with upper limit right ventricle consistent with COPD. She is admitted to the hospital due to shortness of breath that started past few days with some shortness of breath and some congestion. She does not report accompanying chest pain.  Symptoms are worse with exertion and some cough with phlegm is reported. She denies any chest pain. No significant palpitations. She is compensated on exam with clear lungs and trace chronic lower extremity edema, with mild worsening. She  has a past medical history of COPD (chronic obstructive pulmonary disease) ([facility] and Diabetes ([facility] All other systems negative except as above. PE 
Vitals:  
 04/17/21 1210 04/17/21 1216 04/17/21 1800 04/17/21 1935 BP:  (!) 166/90 113/68 130/64 Pulse:  92 92 88 Resp:  20 20 Temp:  97.6 °F (36.4 °C) (!) 96.6 °F (35.9 °C) 97.4 °F (36.3 °C) SpO2: 95% 93% 97% 95% Weight:      
Height:      
 Body mass index is 43.99 kg/m². General appearance - alert, well appearing, and in no distress Mental status - affect appropriate to mood Eyes - sclera anicteric, moist mucous membranes Neck - supple, no JVD Chest - clear to auscultation, no wheezes, rales or rhonchi Heart - normal rate, regular rhythm, normal S1, S2, I/VI systolic murmur LUSB Abdomen - soft, nontender, nondistended, no masses or organomegaly Neurological - no focal deficit Extremities - peripheral pulses normal, no pedal edema Recent Labs: 
No results found for: CHOL, CHOLX, CHLST, CHOLV, 369324, HDL, HDLP, LDL, LDLC, DLDLP, TGLX, TRIGL, TRIGP, CHHD, CHHDX Lab Results Component Value Date/Time Creatinine 2.42 (H) 04/17/2021 02:34 AM  
 
Lab Results Component Value Date/Time BUN 45 (H) 04/17/2021 02:34 AM  
 
Lab Results Component Value Date/Time Potassium 6.6 (HH) 04/17/2021 02:34 AM  
 
Lab Results Component Value Date/Time Hemoglobin A1c 7.3 (H) 04/17/2021 02:43 AM  
 
Lab Results Component Value Date/Time HGB 14.1 04/17/2021 02:34 AM  
 
Lab Results Component Value Date/Time PLATELET 633 54/75/8062 02:34 AM  
 
 
Reviewed: 
Family History: NO premature CAD Past Medical History:  
Diagnosis Date  COPD (chronic obstructive pulmonary disease) (HCC)  [facility] Social History Tobacco Use Smoking Status Former Smoker Smokeless Tobacco Never Used Social History Substance and Sexual Activity Alcohol Use Not Currently Comment: rarely Allergies Allergen Reactions  Ibuprofen Itching Due to kidney failure, cant take any NSAID  Codeine Itching Current Facility-Administered Medications Medication Dose Route Frequency  dextrose (D50W) injection syrg 12.5-25 g  12.5-25 g IntraVENous PRN  
 albuterol-ipratropium (DUO-NEB) 2.5 MG-0.5 MG/3 ML  3 mL Nebulization Q4H PRN  
 allopurinoL (ZYLOPRIM) tablet 300 mg  300 mg Oral DAILY  amLODIPine (NORVASC) tablet 5 mg  5 mg Oral DAILY  aspirin delayed-release tablet 81 mg  81 mg Oral DAILY  atenoloL (TENORMIN) tablet 25 mg  25 mg Oral DAILY  cetirizine (ZYRTEC) tablet 10 mg  10 mg Oral DAILY  cloNIDine HCL (CATAPRES) tablet 0.2 mg  0.2 mg Oral BID  doxazosin (CARDURA) tablet 1 mg  1 mg Oral DAILY  DULoxetine (CYMBALTA) capsule 60 mg  60 mg Oral DAILY  furosemide (LASIX) injection 40 mg  40 mg IntraVENous DAILY  [Held by provider] losartan (COZAAR) tablet 100 mg  100 mg Oral DAILY  rosuvastatin (CRESTOR) tablet 5 mg  5 mg Oral EVERY OTHER DAY  sodium chloride (NS) flush 5-40 mL  5-40 mL IntraVENous Q8H  
 sodium chloride (NS) flush 5-40 mL  5-40 mL IntraVENous PRN  
 acetaminophen (TYLENOL) tablet 650 mg  650 mg Oral Q6H PRN Or  
 acetaminophen (TYLENOL) suppository 650 mg  650 mg Rectal Q6H PRN  polyethylene glycol (MIRALAX) packet 17 g  17 g Oral DAILY PRN  promethazine (PHENERGAN) tablet 12.5 mg  12.5 mg Oral Q6H PRN Or  
 ondansetron (ZOFRAN) injection 4 mg  4 mg IntraVENous Q6H PRN  
 heparin (porcine) injection 5,000 Units  5,000 Units SubCUTAneous Q8H  
 L.acidophilus-paracasei-S.thermophil-bifidobacter (RISAQUAD) 8 billion cell capsule  1 Cap Oral DAILY  predniSONE (DELTASONE) tablet 40 mg  40 mg Oral DAILY WITH BREAKFAST  azithromycin (ZITHROMAX) 500 mg in 0.9% sodium chloride 250 mL (VIAL-MATE)  500 mg IntraVENous Q24H  cefTRIAXone (ROCEPHIN) 1 g in 0.9% sodium chloride (MBP/ADV) 50 mL MBP  1 g IntraVENous Q24H  
 insulin lispro (HUMALOG) injection   SubCUTAneous AC&HS  
 glucose chewable tablet 16 g  4 Tab Oral PRN  
 dextrose (D50W) injection syrg 12.5-25 g  12.5-25 g IntraVENous PRN  
 glucagon (GLUCAGEN) injection 1 mg  1 mg IntraMUSCular PRN Luis Paul MD 
[facility] [place] [address], [address]

## 2021-04-18 NOTE — PROGRESS NOTES
Bedside shift change report given to Tremaine Le (oncoming nurse) by Lisset Hope RN (offgoing nurse). Report included the following information SBAR and Kardex.

## 2021-04-18 NOTE — PROGRESS NOTES
Problem: Falls - Risk of 
Goal: *Absence of Falls Description: Document [place] Fall Risk and appropriate interventions in the flowsheet. Outcome: Progressing Towards Goal 
Note: Fall Risk Interventions: 
Mobility Interventions: Bed/chair exit alarm Mentation Interventions: Bed/chair exit alarm, Adequate sleep, hydration, pain control Medication Interventions: Bed/chair exit alarm Elimination Interventions: Call light in reach, Bed/chair exit alarm Problem: Pressure Injury - Risk of 
Goal: *Prevention of pressure injury Description: Document Nahid Scale and appropriate interventions in the flowsheet. Outcome: Progressing Towards Goal 
Note: Pressure Injury Interventions: 
Sensory Interventions: Maintain/enhance activity level Moisture Interventions: Maintain skin hydration (lotion/cream) Activity Interventions: PT/OT evaluation Mobility Interventions: PT/OT evaluation Nutrition Interventions: Document food/fluid/supplement intake Friction and Shear Interventions: Apply protective barrier, creams and emollients

## 2021-04-18 NOTE — PROGRESS NOTES
Cardiology progress note: 
 
Patient admitted with increasing dyspnea on exertion and orthopnea. Underlying COPD with exacerbation and likely component of HFpEF. Agree with IV diuretics. Will assess response to Lasix 40 mg given this AM. Probably will need larger dose given underlying CKD IV. Morbid obesity and right heart failure may make per poorly diuretic responsive. Imaging with effusion and possible consolidation. Hence also on Antibiotics/short course of steroids. While CAD was suspected, recent stress test was negative--so doubt ischemic is driving symptoms. Full note to follow.

## 2021-04-19 NOTE — PROGRESS NOTES
Transition of Care Plan RUR 22% Received COVID 19 vaccine Nephrology consulted Cardiology consulted Urology consulted Disposition  TBD pending medical progress and recommendations  Home VS rehab Transportation TBD Home Health  TBD--- will arrange if ordered No therapy evaluations ordered Contact Oumou Ramírez [phone]/  Cell [phone] Oumou Aguila [phone] Patient will be given 2nd Medicare letter prior to discharge Reason for Admission:  COPD exacerbation CHF -SOB Medical hx diabetes, hypertension, COPD (no 02 at home) chronic kidney diease  and dyslipidemia RUR Score:  22% PCP: First and Last name:   Gilbert Slater MD 
 
 Name of Practice:  
 Are you a current patient: Yes/No: yes Approximate date of last visit: a few months Can you participate in a virtual visit if needed: no Do you (patient/family) have any concerns for transition/discharge? No  
           
Plan for utilizing home health:   TBD  Will arrange if ordered Current Advanced Directive/Advance Care Plan:  Full Code Healthcare Decision Maker:  
Click here to complete [address] including selection of the Healthcare Decision Maker Relationship (ie \"Primary\") Transition of Care Plan:      TBD pending medical progress and recommendations CM attempted to meet with patient in her room but she was having a procedure-- Thoracentesis. . Cm left message for her daughter, Flory Cates, asking her to call CM regarding transition of care planning for patient UPDATE 3:15 pm 
CM talked with patient in her room. Vira Libman She was alert but somewhat confused. Did confirm with CM she resides with her daughter, Sarah Mack in one level home. She said she does not use any dme prior to admission. CM left another message for daughter, Flory Cates to call CM. CM will wait for daughter to call to complete assessment.    
 
UPDATE 6 pm   
 
CM talked with daughter, Sundar uLx, on the phone. Nirmal Dai confirmed that she moved to [facility] [place] [facility] in 2018 to be closer to patient and family. At that point patient moved in with her. Sundar Lux said at baseline, patient is alert and oriented. . Able to perform most adl's and iadl's with receiving  assistance  as needed. Patient has RW and cane but did not use the dme in the home. Patient was able to make her own simple meals  and stay alone in the house when daughter is working. Sundar Maci works for [employer] and is  Off Thursday and Fridays. Prior to admission, patient was driving short distances at times to  [facility]. [facility] [employer] transports to appointments   Sundar Lux said patient is currently far below baseline Patient has AARP Medicare Complete-- secures medications at [facility] Patient's daughter Chirag Anthony and her  reside in [facility] [place] and are supportive. Sundar Lux said in terms of transition of care, she would prefer patient return home with [facility] and  family assisting and hiring caretakers as needed. She does not want patient to go to rehab Patient applied for Medicaid but was over the resources. Sundar [place] asked about personal care and having a UAI completed for personal care eligibility. She said the family will apply for medicaid again to secure personal care. Sundar Lux did not reveal to CM patient's income but said patient has [employer] and [employer] for income. Patient qualifies for a small amount of food stamps. CM will follow patient's medical progress and recommendations and assist with transition of care planning. Patient is a DNR Care Management Interventions PCP Verified by CM: (could not confirm at this time) Mode of Transport at Discharge: (car/ TBD) Transition of Care Consult (CM Consult): Discharge Planning Discharge Durable Medical Equipment: No 
Physical Therapy Consult: No 
Occupational Therapy Consult: No 
Speech Therapy Consult: No 
Current Support Network: Relative's Home(lives with her daughter   ) Confirm Follow Up Transport: Family Discharge Location Discharge Placement: Home(TBD  pending medical progress and recommendations)

## 2021-04-19 NOTE — PROGRESS NOTES
Patient: Noelle Elmore MRN: [id number]  SSN: [id number] YOB: 1945  Age: 66 y.o. Sex: female ADMITTED: 2021 to Dontrell Louie MD by Marylin Henriquez MD for Acute respiratory failure with hypoxia ([facility] [address] [J96.01] FU for cc of mild left hydronephrosis and IDALIA on CKD. Urology following for mild left hydro noted on US on  per the request of Nephrology for increased creatinine from baseline of approximately 2-2.2 up to 3.1. CT A/P WO obtained on  to r/o stone or other contributing factors. No stones or hydro visualized. Patient with longstanding voiding issues. Bladder scan PVR this admission showed 75 cc. AFVSS. WBC wnl. Cr 3.08 -> 4.11. Patient off the unit at thoracentesis during rounds. Her daughter was at the bedside and updated on CT findings. Vitals: Temp (24hrs), Av.4 °F (36.3 °C), Min:96.9 °F (36.1 °C), Max:98 °F (36.7 °C) Blood pressure 119/72, pulse 83, temperature 97.4 °F (36.3 °C), resp. rate 18, height 5' 4\" (1.626 m), weight 118.7 kg (261 lb 11.2 oz), SpO2 95 %. Intake and Output: 
 1901 -  0700 In: 350 [P.O.:350] Out: 100 [Urine:100] No intake/output data recorded. [place] Output lats 24 hrs: No data found. [place] Output last 8 hrs: No data found. BM over last 24 hrs: No data found. Physical Exam 
Unable to obtain, patient off the unit. Labs: Reviewed. CBC:  
Lab Results Component Value Date/Time WBC 8.7 2021 05:06 AM  
 HCT 42.3 2021 05:06 AM  
 PLATELET [id number] 05:06 AM  
 
BMP:  
Lab Results Component Value Date/Time Glucose 205 (H) 2021 05:06 AM  
 Sodium 130 (L) 2021 05:06 AM  
 Potassium 4.8 2021 05:06 AM  
 Chloride 92 (L) 2021 05:06 AM  
 CO2 29 2021 05:06 AM  
 BUN 69 (H) 2021 05:06 AM  
 Creatinine 4.11 (H) 2021 05:06 AM  
 Calcium 7.7 (L) 2021 05:06 AM  
 
 
Imaging: CT A/P images personally reviewed.   
 
CT A/P WO : KIDNEYS/URETERS: No calculus or hydronephrosis. STOMACH: Unremarkable. SMALL BOWEL: No dilatation or wall thickening. COLON: No dilatation or wall thickening. APPENDIX: Normal. 
PERITONEUM: No ascites or pneumoperitoneum. RETROPERITONEUM: No lymphadenopathy or aortic aneurysm. REPRODUCTIVE ORGANS: Normal. 
URINARY BLADDER: No mass or calculus. BONES: Degenerative changes of the spine. ABDOMINAL WALL: No mass or hernia. ADDITIONAL COMMENTS: N/A 
  
IMPRESSION 1. No acute findings. No evidence of hydronephrosis. 2. Right pleural effusion. Renal US 4/17 FINDINGS: 
Right kidney measures 13.1 cm. Left kidney measures 11.7 cm. Evaluation for mass 
is limited. Kidneys are mildly echogenic. Mild left-sided hydronephrosis 
  
Aorta and iliac vessels are poorly visualized. The visualized portions of the 
proximal aorta are normal.  The IVC is normal. No retroperitoneal mass is 
identified. 
  
The urinary bladder is incompletely distended 
  
IMPRESSION Limited exam. Echogenic kidneys which can be seen with medical renal disease. Mild left-sided hydronephrosis Assessment/Plan: 1. IDALIA on CKD 2. Mild Left Hydro, resolved 3. Hx of voiding difficulties 4. Hx of Left renal mass which has been incompletely characterized and may just be lobulation or scarring - Management of IDALIA per Nephrology. CT images reviewed, ruled out obstructive uropathy. - Left hydro resolved on CT.  
- PVR bladder scan 75 cc. Okay to continue to void independently. - Outpatient follow-up of left renal mass as previously planned. - No further Urology interventions, will sign off. Please call if questions or concerns. Supervising Dr. Quirino OSEGUERA. Signed By: Cass Ghosh NP - April 19, 2021

## 2021-04-19 NOTE — PROGRESS NOTES
[address] [facility] Adult  Hospitalist Group Hospitalist Progress Note Artur Millard MD 
Answering service: [phone] OR [phone] from in house phone Progress Note Patient: Roland Marcus MRN: [id number]  SSN: [id number] YOB: 1945  Age: 66 y.o. Sex: female Admit Date: 4/16/2021 LOS: 3 days Subjective:  
 
Patient presents with COPD exacerbation as well as CHF and possible pneumonia. Has been coughing. Requiring supplemental oxygen of 2 L. At baseline patient does not use oxygen. Objective:  
 
Vitals:  
 04/19/21 0813 04/19/21 0917 04/19/21 1224 04/19/21 1353 BP: 115/62 115/62 102/60 (!) 76/48 Pulse: 91  83 81 Resp: 18  18 20 Temp: 97.5 °F (36.4 °C)  97.6 °F (36.4 °C) 97.6 °F (36.4 °C) SpO2: 91%  95% 96% Weight:  118.4 kg (261 lb) Height:  5' 4\" (1.626 m) Intake and Output: 
Current Shift: No intake/output data recorded. Last three shifts: 04/17 1901 - 04/19 0700 In: 350 [P.O.:350] Out: 100 [Urine:100] Physical Exam:  
GENERAL: alert, cooperative, no distress, appears stated age THROAT & NECK: normal and no erythema or exudates noted. LUNG: clear to auscultation bilaterally HEART: regular rate and rhythm, S1, S2 normal, no murmur, click, rub or gallop ABDOMEN: soft, non-tender. Bowel sounds normal. No masses,  no organomegaly EXTREMITIES:  extremities normal, atraumatic, no cyanosis or edema SKIN: no rash or abnormalities NEUROLOGIC: AOx3. PSYCHIATRIC: non focal 
 
Lab/Data Review: All lab results for the last 24 hours reviewed. Recent Results (from the past 24 hour(s)) GLUCOSE, POC Collection Time: 04/18/21  4:53 PM  
Result Value Ref Range Glucose (POC) 283 (H) 65 - 100 mg/dL Performed by Dhaval Courtney GLUCOSE, POC Collection Time: 04/18/21 10:16 PM  
Result Value Ref Range  Glucose (POC) 300 (H) 65 - 100 mg/dL Performed by Tania Ortiz PHOSPHORUS Collection Time: 04/19/21  5:06 AM  
Result Value Ref Range Phosphorus 8.8 (H) 2.6 - 4.7 MG/DL  
METABOLIC PANEL, BASIC Collection Time: 04/19/21  5:06 AM  
Result Value Ref Range Sodium 130 (L) 136 - 145 mmol/L Potassium 4.8 3.5 - 5.1 mmol/L Chloride 92 (L) 97 - 108 mmol/L  
 CO2 29 21 - 32 mmol/L Anion gap 9 5 - 15 mmol/L Glucose 205 (H) 65 - 100 mg/dL BUN 69 (H) 6 - 20 MG/DL Creatinine 4.11 (H) 0.55 - 1.02 MG/DL  
 BUN/Creatinine ratio 17 12 - 20 GFR est AA 13 (L) >60 ml/min/1.73m2 GFR est non-AA 11 (L) >60 ml/min/1.73m2 Calcium 7.7 (L) 8.5 - 10.1 MG/DL  
CBC W/O DIFF Collection Time: 04/19/21  5:06 AM  
Result Value Ref Range WBC 8.7 3.6 - 11.0 K/uL  
 RBC 3.78 (L) 3.80 - 5.20 M/uL  
 HGB 11.9 11.5 - 16.0 g/dL HCT 42.3 35.0 - 47.0 % .9 (H) 80.0 - 99.0 FL  
 MCH 31.5 26.0 - 34.0 PG  
 MCHC 28.1 (L) 30.0 - 36.5 g/dL  
 RDW 16.1 (H) 11.5 - 14.5 % PLATELET 902 012 - 994 K/uL MPV 10.3 8.9 - 12.9 FL  
 NRBC 1.8 (H) 0  WBC ABSOLUTE NRBC 0.16 (H) 0.00 - 0.01 K/uL MAGNESIUM Collection Time: 04/19/21  5:06 AM  
Result Value Ref Range Magnesium 2.3 1.6 - 2.4 mg/dL GLUCOSE, POC Collection Time: 04/19/21  6:56 AM  
Result Value Ref Range Glucose (POC) 197 (H) 65 - 100 mg/dL Performed by Tania Ortiz ECHO ADULT COMPLETE Collection Time: 04/19/21  9:40 AM  
Result Value Ref Range IVSd 0.75 0.60 - 0.90 cm LVIDd 4.72 3.90 - 5.30 cm LVIDs 3.09 cm  
 LVOT d 1.88 cm  
 LVPWd 1.07 (A) 0.60 - 0.90 cm  
 LVOT Peak Gradient 5.74 mmHg LVOT SV 66.0 mL  
 LVOT Peak Velocity 119.80 cm/s LVOT VTI 23.86 cm RVIDd 4.62 cm Left Atrium Major Axis 4.31 cm  
 LA Volume 59.61 22.0 - 52.0 mL  
 LA Area 4C 20.85 cm2 LA Vol 2C 60.81 (A) 22.00 - 52.00 mL LA Vol 4C 53.82 (A) 22.00 - 52.00 mL Aortic Valve Area by Continuity of Peak Velocity 1.61 cm2  Aortic Valve Area by Continuity of VTI 1.81 cm2 AoV PG 16.89 mmHg Aortic Valve Systolic Mean Gradient 9.75 mmHg Aortic Valve Systolic Peak Velocity 413.82 cm/s AoV VTI 36.43 cm  
 MV A Niko 101.50 cm/s Mitral Valve E Wave Deceleration Time 158.94 ms MV E Niko 89.39 cm/s Mitral Valve Pressure Half-time 46.09 ms  
 MVA (PHT) 4.77 cm2 Pulmonic Valve Systolic Peak Instantaneous Gradient 5.58 mmHg Tapse 1.67 1.50 - 2.00 cm Triscuspid Valve Regurgitation Peak Gradient 45.78 mmHg  
 TR Max Velocity 338.31 cm/s Ao Root D 2.62 cm  
 MV E/A 0.88 LV Mass .8 67.0 - 162.0 g  
 LV Mass AL Index 66.6 43.0 - 95.0 g/m2 Left Atrium Minor Axis 1.97 cm  
 LA Vol Index 27.22 16.00 - 28.00 ml/m2 LA Vol Index 27.76 16.00 - 28.00 ml/m2 LA Vol Index 24.57 16.00 - 28.00 ml/m2 CHRISTY/BSA Pk Niko 0.7 cm2/m2 CHRISTY/BSA VTI 0.8 cm2/m2 GLUCOSE, POC Collection Time: 04/19/21 11:53 AM  
Result Value Ref Range Glucose (POC) 162 (H) 65 - 100 mg/dL Performed by Fariha Dolan Imaging: 
 
Echo Adult Complete Result Date: 4/19/2021 · LV: Estimated LVEF is 55%. Normal cavity size and systolic function (ejection fraction normal). Mild concentric hypertrophy. Wall motion: normal. · RV: Mildly dilated right ventricle. RA: Moderately dilated right atrium. LA: Mildly dilated left atrium. · AV: Mild aortic stenosis. TV: Mild tricuspid valve regurgitation is present. Assessment and Plan:  
 
Acute exacerbation of COPD 
-Continue prednisone and breathing treatment 
-Continue empiric antibiotics with ceftriaxone and azithromycin 
-Pulmonology evaluation Congestive heart failure 
-Acute congestive heart failure unknown systolic or diastolic 
-Echo with normal EF 
-Diuresis with Lasix Pleural effusion 
-Right-sided pleural effusion with partial collapse of the middle right lower lobe 
-Pulmonology consulted 
-Plan for IR thoracentesis with pleural fluid analysis including cell count, culture, LDH and protein Abnormal D-dimer 
-Duplex negative for DVT 
-VQ scan cannot be done due to to patient cannot lay flat 
-Will cancel VQ scan due to low probability of PE Hyponatremia 
-Free water restriction 
-Nephrology following Hyperkalemia 
-Patient received Kayexalate 4/17 with improving sodium level 
-Monitor electrolytes CKD stage IV 
-Creatinine trending up -Nephrology following 
-Losartan held 
-Questions of left hydronephrosis on initial ultrasound, follow-up CT does not show hydronephrosis Hypertension 
-Continue amlodipine, clonidine and atenolol Diabetes 
-Continue insulin sliding scale coverage Dyslipidemia 
-Continue statin Discharge disposition: Likely more than 48 hours pending progress. Signed By: Zoila Henry MD   
 April 19, 2021

## 2021-04-19 NOTE — PROGRESS NOTES
I tried both arms and was unsuccessful with the ABG, will have another RT attempt or run VBG with nurse assistance.

## 2021-04-19 NOTE — PROGRESS NOTES
Problem: Diabetes Self-Management Goal: *Using medications safely Description: State effect of diabetes medications on diabetes; name diabetes medication taking, action and side effects. Outcome: Progressing Towards Goal 
Goal: *Monitoring blood glucose, interpreting and using results Description: Identify recommended blood glucose targets  and personal targets. Outcome: Progressing Towards Goal 
Goal: *Prevention, detection, treatment of acute complications Description: List symptoms of hyper- and hypoglycemia; describe how to treat low blood sugar and actions for lowering  high blood glucose level. Outcome: Progressing Towards Goal 
  
Problem: Falls - Risk of 
Goal: *Absence of Falls Description: Document Hawley Roxanamanuel Fall Risk and appropriate interventions in the flowsheet. Outcome: Progressing Towards Goal 
Note: Fall Risk Interventions: 
Mobility Interventions: Patient to call before getting OOB Mentation Interventions: Adequate sleep, hydration, pain control Medication Interventions: Evaluate medications/consider consulting pharmacy Elimination Interventions: Call light in reach Problem: Pressure Injury - Risk of 
Goal: *Prevention of pressure injury Description: Document Nahid Scale and appropriate interventions in the flowsheet. Outcome: Progressing Towards Goal 
Note: Pressure Injury Interventions: 
Sensory Interventions: Assess changes in LOC Moisture Interventions: Absorbent underpads Activity Interventions: Increase time out of bed Mobility Interventions: Assess need for specialty bed Nutrition Interventions: Document food/fluid/supplement intake Friction and Shear Interventions: Apply protective barrier, creams and emollients Problem: Breathing Pattern - Ineffective Goal: *Use of effective breathing techniques Outcome: Progressing Towards Goal

## 2021-04-19 NOTE — PROGRESS NOTES
Bedside and Verbal shift change report given to America Mata (oncoming nurse) by Sandra Rosario (offgoing nurse). Report included the following information SBAR, Kardex, Procedure Summary, Intake/Output, MAR and Recent Results.

## 2021-04-19 NOTE — CONSULTS
Name: Jeff Guevara Drive: Salo Ibrahim  
: 1945 Admit Date: 2021 Phone: [phone]  Room: [address] [place] [address] PCP: Lillian Lofton MD  MRN: [id number] Date: 2021  Code: Full Code HPI: 
 
1:05 PM    
 
History was obtained from patient and daughter. I was asked by Kevin Toledo MD to see Lucio Christians in consultation for a chief complaint of shortness of breath. History of Present Illness: 66year old female with past medical history of nicotine dependence and COPD who presented to [facility] with increased shortness of breath. The shortness of breath is worse with exertion such as walking and also when the patient is lying down flat. No fever, chills and night sweats. She denies chest pain or chest tightness. She does have cough but no sputum. In the ED sats were low at 60%. In the morning she was taken down for a VQ scan to rule out PE Patient is a former smoker with 1 ppd x 40 years. COPD with FEV1 51% followed by Dr Pastor Ramírez in the office. Data: 
Hgb 11.9 WBC 8.7  D-dimer 1.32 Cr 4.11 
CT Chest - Moderate right pleural effusion with partial collapse of the right middle and lower lobes. Dilated main pulmonary artery which can be seen with pulmonary artery hypertension. VBG 7.11 / > 95/ 51 TTE: EF 55%. Past Medical History:  
Diagnosis Date  COPD (chronic obstructive pulmonary disease) (HCC)  Diabetes ([facility] [address] History reviewed. No pertinent surgical history. History reviewed. No pertinent family history. Social History Tobacco Use  Smoking status: Former Smoker  Smokeless tobacco: Never Used Substance Use Topics  Alcohol use: Not Currently Comment: rarely Allergies Allergen Reactions  Ibuprofen Itching Due to kidney failure, cant take any NSAID  Codeine Itching Current Facility-Administered Medications Medication Dose Route Frequency  sodium zirconium cyclosilicate (LOKELMA) powder packet 5 g  5 g Oral DAILY  albuterol-ipratropium (DUO-NEB) 2.5 MG-0.5 MG/3 ML  3 mL Nebulization Q6H RT  
 ammonium lactate (AMLACTIN) 12 % cream   Topical PRN  
 dextrose (D50W) injection syrg 12.5-25 g  12.5-25 g IntraVENous PRN  
 allopurinoL (ZYLOPRIM) tablet 300 mg  300 mg Oral DAILY  amLODIPine (NORVASC) tablet 5 mg  5 mg Oral DAILY  aspirin delayed-release tablet 81 mg  81 mg Oral DAILY  atenoloL (TENORMIN) tablet 25 mg  25 mg Oral DAILY  cetirizine (ZYRTEC) tablet 10 mg  10 mg Oral DAILY  cloNIDine HCL (CATAPRES) tablet 0.2 mg  0.2 mg Oral BID  doxazosin (CARDURA) tablet 1 mg  1 mg Oral DAILY  DULoxetine (CYMBALTA) capsule 60 mg  60 mg Oral DAILY  [Held by provider] furosemide (LASIX) injection 40 mg  40 mg IntraVENous DAILY  [Held by provider] losartan (COZAAR) tablet 100 mg  100 mg Oral DAILY  rosuvastatin (CRESTOR) tablet 5 mg  5 mg Oral EVERY OTHER DAY  sodium chloride (NS) flush 5-40 mL  5-40 mL IntraVENous Q8H  
 sodium chloride (NS) flush 5-40 mL  5-40 mL IntraVENous PRN  
 acetaminophen (TYLENOL) tablet 650 mg  650 mg Oral Q6H PRN Or  
 acetaminophen (TYLENOL) suppository 650 mg  650 mg Rectal Q6H PRN  polyethylene glycol (MIRALAX) packet 17 g  17 g Oral DAILY PRN  promethazine (PHENERGAN) tablet 12.5 mg  12.5 mg Oral Q6H PRN Or  
 ondansetron (ZOFRAN) injection 4 mg  4 mg IntraVENous Q6H PRN  
 heparin (porcine) injection 5,000 Units  5,000 Units SubCUTAneous Q8H  
 L.acidophilus-paracasei-S.thermophil-bifidobacter (RISAQUAD) 8 billion cell capsule  1 Cap Oral DAILY  predniSONE (DELTASONE) tablet 40 mg  40 mg Oral DAILY WITH BREAKFAST  azithromycin (ZITHROMAX) 500 mg in 0.9% sodium chloride 250 mL (VIAL-MATE)  500 mg IntraVENous Q24H  cefTRIAXone (ROCEPHIN) 1 g in 0.9% sodium chloride (MBP/ADV) 50 mL MBP  1 g IntraVENous Q24H  
 insulin lispro (HUMALOG) injection   SubCUTAneous AC&HS  
 glucose chewable tablet 16 g  4 Tab Oral PRN  dextrose (D50W) injection syrg 12.5-25 g  12.5-25 g IntraVENous PRN  
 glucagon (GLUCAGEN) injection 1 mg  1 mg IntraMUSCular PRN  
 
 
 
REVIEW OF SYSTEMS Negative except as stated in the HPI. Physical Exam:  
Visit Vitals /60 (BP 1 Location: Right lower arm, BP Patient Position: At rest) Pulse 83 Temp 97.6 °F (36.4 °C) Resp 18 Ht 5' 4\" (1.626 m) Wt 118.4 kg (261 lb) SpO2 95% BMI 44.80 kg/m² General:  Alert, cooperative, no distress, appears stated age. Head:  Normocephalic, without obvious abnormality, atraumatic. Eyes:  Conjunctivae/corneas clear. PERRL, EOMs intact. Nose: Nares normal. Septum midline. Mucosa normal. No drainage or sinus tenderness. Throat: Lips, mucosa, and tongue normal. Teeth and gums normal.  
Neck: Supple, symmetrical, trachea midline, no adenopathy, thyroid: no enlargment/tenderness/nodules, no carotid bruit and no JVD. Lungs:   Clear to auscultation bilaterally. Heart:  Regular rate and rhythm, S1, S2 normal, no murmur, click, rub or gallop. Abdomen:   Soft, non-tender. Bowel sounds normal. No masses,  No organomegaly. Extremities: Extremities normal, atraumatic, no cyanosis or edema. Pulses: 2+ and symmetric all extremities. Skin: Skin color, texture, turgor normal. No rashes or lesions Neurologic: Grossly nonfocal  
 
 
Lab Results Component Value Date/Time Sodium 130 (L) 04/19/2021 05:06 AM  
 Potassium 4.8 04/19/2021 05:06 AM  
 Chloride 92 (L) 04/19/2021 05:06 AM  
 CO2 29 04/19/2021 05:06 AM  
 BUN 69 (H) 04/19/2021 05:06 AM  
 Creatinine 4.11 (H) 04/19/2021 05:06 AM  
 Glucose 205 (H) 04/19/2021 05:06 AM  
 Calcium 7.7 (L) 04/19/2021 05:06 AM  
 Magnesium 2.3 04/19/2021 05:06 AM  
 Phosphorus 8.8 (H) 04/19/2021 05:06 AM  
 
 
Lab Results Component Value Date/Time  WBC 8.7 04/19/2021 05:06 AM  
 HGB 11.9 04/19/2021 05:06 AM  
 PLATELET 647 15/74/6910 05:06 AM  
 .9 (H) 04/19/2021 05:06 AM  
 
 Lab Results Component Value Date/Time Alk. phosphatase 146 (H) 04/17/2021 02:34 AM  
 Protein, total 7.1 04/17/2021 02:34 AM  
 Albumin 3.1 (L) 04/17/2021 02:34 AM  
 Globulin 4.0 04/17/2021 02:34 AM  
 
 
Lab Results Component Value Date/Time TSH 3.56 04/17/2021 02:43 AM  
 
 
Lab Results Component Value Date/Time  (H) 04/18/2021 06:29 AM  
 Troponin-I, Qt. <0.05 04/17/2021 02:34 AM  
 
 
Lab Results Component Value Date/Time  (H) 04/18/2021 06:29 AM  
 
 
Lab Results Component Value Date/Time Color YELLOW/STRAW 04/18/2021 10:40 AM  
 Appearance CLOUDY (A) 04/18/2021 10:40 AM  
 pH (UA) 5.0 04/18/2021 10:40 AM  
 Protein 300 (A) 04/18/2021 10:40 AM  
 Glucose 250 (A) 04/18/2021 10:40 AM  
 Ketone Negative 04/18/2021 10:40 AM  
 Bilirubin Negative 04/18/2021 10:40 AM  
 Blood MODERATE (A) 04/18/2021 10:40 AM  
 Urobilinogen 1.0 04/18/2021 10:40 AM  
 Nitrites Negative 04/18/2021 10:40 AM  
 Leukocyte Esterase Negative 04/18/2021 10:40 AM  
 WBC 0-4 04/18/2021 10:40 AM  
 RBC 0-5 04/18/2021 10:40 AM  
 Bacteria Negative 04/18/2021 10:40 AM  
 
 
IMPRESSION: 
=========== 
-Right side pleural effusion. -RML/RLL atelectasis secondary to compressive atelectasis from pleural effusion. 
-Hypoxemic and hypercarbic respiratory failure secondary to atelectasis (shunting). -COPD with FEV1 0.99 L (50%). -Restrictive lung disease with TLC 65% - extraparenchymal restriction from obesity. 
-Snoring: rule out sleep apnea. PLAN: 
===== 
-Right Pleural tap to define the etiology of effusion. -ABG post tap to assess the ventilation / oxygen. 
-If pCO2 still high, will consider Triology (AVAPS-AE). -My suspicion of PE is low. Once fluid is drained, can check VQ scan as it will be easier for her lie down. 
-Out patient follow up with Dr Maria Del Carmen Harp. Thank you for the consult. D/w Daughter in detail.  
 
Adriano Obando MD

## 2021-04-19 NOTE — PROGRESS NOTES
[place]: Audrey Henderson 
[phone] Reason for hospitalization: Increasing dyspnea Reason for consult: Possible congestive HF Subjective: Reports SOB, she is not sure that it is any different than yesterday. Tells me that she did not tolerate thoracentesis this a.m.- Reports she was unable to complete due to SOB. Assessment and Plan: 1. Coronary calcification 2. COPD. 3. Essential hypertension. Blood pressure is controlled. 4. IDALIA on Chronic kidney disease, stage 4. Followed by nephrology, Dr. Ander Mccarty with IDALIA. Nephrology following. 5. Palpitations. Occasional PACs. 6. Mixed hyperlipidemia. 7. Morbid obesity. 8. History of tobacco use. 9. Pleural effusion of uncertain etiology 10. Acute hypoxemia respiratory failure likely secondary to acute exacerbation of COPD, some HFpEF and pleural effusion. 11. Hyperkalemia- potassium normalized 13. Hyponatremia- 
14. Mild Aortic stenosis 15. Mild Left hydronephrosis- resolved. Echocardiogram today with Normal EF, NWMA,  Mild AS. Patient Probably has mild diastolic CHF with underlying COPD with acute exacerbation in setting of pleural effusion and IDALIA. Pleural effusion contributing to symptoms and may be secondary to DHF. Attempted Diuretics--creatinine bumped and creatinine continues to rise. Continue to hold diuretics. Paulette Heman No ACE-I or ARB due to renal dysfunction. No NSAIDs. Thoracentesis recommended for symptom relief and diagnostic analysis. - pt tells me she did not tolerate today. I feel that her shortness of breath is predominantly pulmonary etiology. No further cardiac testing. Defer further management to primary team. 
 
Investigation reviewed:  
Echocardiogram: 4/19/21: 
04/16/21 ECHO ADULT COMPLETE 04/19/2021 4/19/2021 Narrative · LV: Estimated LVEF is 55%. Normal cavity size and systolic function  
(ejection fraction normal). Mild concentric hypertrophy.  Wall motion:  
normal. 
· RV: Mildly dilated right ventricle. RA: Moderately dilated right  
atrium. LA: Mildly dilated left atrium. · AV: Mild aortic stenosis. TV: Mild tricuspid valve regurgitation is  
present. Signed by: Lisa Aguilar MD  
 
 
Stress test in November 2020 was negative for ischemia. Normal LVEF Echo November 2020: Normal EF, mildly dilated RV, mild AS. ECG: April 2021: Low voltage, SR History of Present Illness:   Ms. Palmer Tijerina is a 67 yo F with a history of CKD stage 4 with baseline creatinine of 1.8-2.2, DM2 and long NSAID use, secondary hyperparathyroidism, history of CVA in 2013 and subsequent CEA on the left side in 2013, history of knee replacement in 2010, history of tobacco use quit many years ago. Abdominal CT scan incidentally noted calcification in the coronary arteries. Prior echo was normal with upper limit right ventricle consistent with COPD. She is admitted to the hospital due to shortness of breath that started past few days with some shortness of breath and some congestion. She does not report accompanying chest pain. Symptoms are worse with exertion and some cough with phlegm is reported. She denies any chest pain. No significant palpitations. She is compensated on exam with clear lungs and trace chronic lower extremity edema, with mild worsening. She  has a past medical history of COPD (chronic obstructive pulmonary disease) ([address] and Diabetes ([address] All other systems negative except as above. PE 
Vitals:  
 04/19/21 0413 04/19/21 0813 04/19/21 7265 04/19/21 1224 BP: 119/72 115/62 115/62 102/60 Pulse: 83 91  83 Resp: 18 18  18 Temp: 97.4 °F (36.3 °C) 97.5 °F (36.4 °C)  97.6 °F (36.4 °C) SpO2: 95% 91%  95% Weight:   261 lb (118.4 kg) Height:   5' 4\" (1.626 m) Body mass index is 44.8 kg/m². General appearance - alert,in no distress. Sitting up in chair. Mental status - affect appropriate to mood Eyes - sclera anicteric, moist mucous membranes Neck - supple, Chest - clear to auscultation, no wheezes, rales or rhonchi Heart - normal rate, regular rhythm, normal S1, S2, I/VI systolic murmur LUSB Abdomen - soft, nontender, nondistended, no masses or organomegaly Neurological - no focal deficit Extremities - peripheral pulses normal, no pedal edema Recent Labs: 
No results found for: CHOL, CHOLX, CHLST, CHOLV, 263799, HDL, HDLP, LDL, LDLC, DLDLP, TGLX, TRIGL, TRIGP, CHHD, CHHDX Lab Results Component Value Date/Time Creatinine 4.11 (H) 04/19/2021 05:06 AM  
 
Lab Results Component Value Date/Time BUN 69 (H) 04/19/2021 05:06 AM  
 
Lab Results Component Value Date/Time Potassium 4.8 04/19/2021 05:06 AM  
 
Lab Results Component Value Date/Time Hemoglobin A1c 7.3 (H) 04/17/2021 02:43 AM  
 
Lab Results Component Value Date/Time HGB 11.9 04/19/2021 05:06 AM  
 
Lab Results Component Value Date/Time PLATELET 383 01/12/2490 05:06 AM  
 
 
Reviewed: 
Family History: NO premature CAD Past Medical History:  
Diagnosis Date  COPD (chronic obstructive pulmonary disease) (HCC)  Diabetes ([facility] [address] Social History Tobacco Use Smoking Status Former Smoker Smokeless Tobacco Never Used Social History Substance and Sexual Activity Alcohol Use Not Currently Comment: rarely Allergies Allergen Reactions  Ibuprofen Itching Due to kidney failure, cant take any NSAID  Codeine Itching Current Facility-Administered Medications Medication Dose Route Frequency  sodium zirconium cyclosilicate (LOKELMA) powder packet 5 g  5 g Oral DAILY  albuterol-ipratropium (DUO-NEB) 2.5 MG-0.5 MG/3 ML  3 mL Nebulization Q6H RT  
 ammonium lactate (AMLACTIN) 12 % cream   Topical PRN  
 dextrose (D50W) injection syrg 12.5-25 g  12.5-25 g IntraVENous PRN  
 allopurinoL (ZYLOPRIM) tablet 300 mg  300 mg Oral DAILY  amLODIPine (NORVASC) tablet 5 mg  5 mg Oral DAILY  aspirin delayed-release tablet 81 mg  81 mg Oral DAILY  atenoloL (TENORMIN) tablet 25 mg  25 mg Oral DAILY  cetirizine (ZYRTEC) tablet 10 mg  10 mg Oral DAILY  cloNIDine HCL (CATAPRES) tablet 0.2 mg  0.2 mg Oral BID  doxazosin (CARDURA) tablet 1 mg  1 mg Oral DAILY  DULoxetine (CYMBALTA) capsule 60 mg  60 mg Oral DAILY  [Held by provider] furosemide (LASIX) injection 40 mg  40 mg IntraVENous DAILY  [Held by provider] losartan (COZAAR) tablet 100 mg  100 mg Oral DAILY  rosuvastatin (CRESTOR) tablet 5 mg  5 mg Oral EVERY OTHER DAY  sodium chloride (NS) flush 5-40 mL  5-40 mL IntraVENous Q8H  
 sodium chloride (NS) flush 5-40 mL  5-40 mL IntraVENous PRN  
 acetaminophen (TYLENOL) tablet 650 mg  650 mg Oral Q6H PRN Or  
 acetaminophen (TYLENOL) suppository 650 mg  650 mg Rectal Q6H PRN  polyethylene glycol (MIRALAX) packet 17 g  17 g Oral DAILY PRN  promethazine (PHENERGAN) tablet 12.5 mg  12.5 mg Oral Q6H PRN Or  
 ondansetron (ZOFRAN) injection 4 mg  4 mg IntraVENous Q6H PRN  
 heparin (porcine) injection 5,000 Units  5,000 Units SubCUTAneous Q8H  
 L.acidophilus-paracasei-S.thermophil-bifidobacter (RISAQUAD) 8 billion cell capsule  1 Cap Oral DAILY  predniSONE (DELTASONE) tablet 40 mg  40 mg Oral DAILY WITH BREAKFAST  azithromycin (ZITHROMAX) 500 mg in 0.9% sodium chloride 250 mL (VIAL-MATE)  500 mg IntraVENous Q24H  cefTRIAXone (ROCEPHIN) 1 g in 0.9% sodium chloride (MBP/ADV) 50 mL MBP  1 g IntraVENous Q24H  
 insulin lispro (HUMALOG) injection   SubCUTAneous AC&HS  
 glucose chewable tablet 16 g  4 Tab Oral PRN  
 dextrose (D50W) injection syrg 12.5-25 g  12.5-25 g IntraVENous PRN  
 glucagon (GLUCAGEN) injection 1 mg  1 mg IntraMUSCular PRN Peyman Davis. JILL [facility] [address]

## 2021-04-19 NOTE — PROGRESS NOTES
NAME: Nabeel Munoz :  1945 MRN:  [id number] Assessment   :                                               Plan: IDALIA on CKD stage 4 Hyperkalemia Hyponatremia HTN 
DM2 Hypoxia Creatinine worse today 2.4 to 3.1 to 4mg/dl (follows with Dr. Manisha Sánchez; baseline creatinine 2.2; CKD from DM/HTN/NSAIDS) She may be intravascularly depleted (but whole body volume overload) Send off Urine Na, Urine Cr And spot urine protein/cr ratio Need to avoid hypotension Trial of IV Albumin 
  
H/o kidney mass? Not seen on a limited ultrasound ; Mild left-sided hydronephrosis - Urology consulted- CT abd shows no hydro. Check PVR on bladder scan 
  
K better. On low dose Lokelma Ct holding Losartan and Lasix 
  
Na 129 to 130 -- free water restrict Avoid nsaids completely Discussed case with Dr. Oscar Joya 
  
  
 
Subjective: Chief Complaint:  S/p R thoracentesis-> 900ml removed. +Hypotensive. No specific complaint Review of Systems: 
 
Symptom Y/N Comments  Symptom Y/N Comments Fever/Chills    Chest Pain Poor Appetite    Edema Cough    Abdominal Pain Sputum    Joint Pain SOB/BARNEY    Pruritis/Rash Nausea/vomit    Tolerating PT/OT Diarrhea    Tolerating Diet Constipation    Other Could not obtain due to: See above Objective: VITALS:  
Last 24hrs VS reviewed since prior progress note. Most recent are: 
Visit Vitals BP (!) 90/54 Pulse 81 Temp 97.6 °F (36.4 °C) Resp 20 Ht 5' 4\" (1.626 m) Wt 118.4 kg (261 lb) SpO2 94% BMI 44.80 kg/m² Intake/Output Summary (Last 24 hours) at 2021 1528 Last data filed at 2021 1356 Gross per 24 hour Intake 20 ml Output  Net 20 ml Telemetry Reviewed: PHYSICAL EXAM: 
General: NAD Clear anterior No sydnie Lab Data Reviewed: (see below) Medications Reviewed: (see below) PMH/SH reviewed - no change compared to H&P 
________________________________________________________________________ Care Plan discussed with: 
Patient Y Family RN Akilah Keene Care Manager Consultant:     
 
  Comments >50% of visit spent in counseling and coordination of care    
 
________________________________________________________________________ Shari Arechiga MD  
 
Procedures: see electronic medical records for all procedures/Xrays and details which 
were not copied into this note but were reviewed prior to creation of Plan. LABS: 
Recent Labs 04/19/21 
0421 04/18/21 
7127 WBC 8.7 11.2* HGB 11.9 12.3 HCT 42.3 44.2  180 Recent Labs 04/19/21 
9009 04/18/21 
0396 04/17/21 
0234 * 130* 129*  
K 4.8 5.4* 6.6*  
CL 92* 93* 94* CO2 29 32 29 BUN 69* 57* 45* CREA 4.11* 3.08* 2.42* * 198* 241* CA 7.7* 7.9* 8.8 MG 2.3 2.3 2.3 PHOS 8.8* 8.3* 6.8* Recent Labs 04/17/21 
0234 04/16/21 
1652 * 137* TP 7.1 7.0 ALB 3.1* 3.2*  
GLOB 4.0 3.8 No results for input(s): INR, PTP, APTT, INREXT, INREXT in the last 72 hours. No results for input(s): FE, TIBC, PSAT, FERR in the last 72 hours. No results found for: FOL, RBCF No results for input(s): PH, PCO2, PO2 in the last 72 hours. Recent Labs 04/18/21 
7600 * No components found for: [place] Lab Results Component Value Date/Time  Color YELLOW/STRAW 04/18/2021 10:40 AM  
 Appearance CLOUDY (A) 04/18/2021 10:40 AM  
 Specific gravity 1.017 04/18/2021 10:40 AM  
 pH (UA) 5.0 04/18/2021 10:40 AM  
 Protein 300 (A) 04/18/2021 10:40 AM  
 Glucose 250 (A) 04/18/2021 10:40 AM  
 Ketone Negative 04/18/2021 10:40 AM  
 Bilirubin Negative 04/18/2021 10:40 AM  
 Urobilinogen 1.0 04/18/2021 10:40 AM  
 Nitrites Negative 04/18/2021 10:40 AM  
 Leukocyte Esterase Negative 04/18/2021 10:40 AM  
 Epithelial cells FEW 04/18/2021 10:40 AM  
 Bacteria Negative 04/18/2021 10:40 AM  
 WBC 0-4 04/18/2021 10:40 AM  
 RBC 0-5 04/18/2021 10:40 AM  
 
 
MEDICATIONS: 
Current Facility-Administered Medications Medication Dose Route Frequency  albumin human 25% (BUMINATE) solution 12.5 g  12.5 g IntraVENous Q6H  
 sodium zirconium cyclosilicate (LOKELMA) powder packet 5 g  5 g Oral DAILY  albuterol-ipratropium (DUO-NEB) 2.5 MG-0.5 MG/3 ML  3 mL Nebulization Q6H RT  
 ammonium lactate (AMLACTIN) 12 % cream   Topical PRN  
 dextrose (D50W) injection syrg 12.5-25 g  12.5-25 g IntraVENous PRN  
 allopurinoL (ZYLOPRIM) tablet 300 mg  300 mg Oral DAILY  [Held by provider] amLODIPine (NORVASC) tablet 5 mg  5 mg Oral DAILY  aspirin delayed-release tablet 81 mg  81 mg Oral DAILY  [Held by provider] atenoloL (TENORMIN) tablet 25 mg  25 mg Oral DAILY  cetirizine (ZYRTEC) tablet 10 mg  10 mg Oral DAILY  [Held by provider] cloNIDine HCL (CATAPRES) tablet 0.2 mg  0.2 mg Oral BID  doxazosin (CARDURA) tablet 1 mg  1 mg Oral DAILY  DULoxetine (CYMBALTA) capsule 60 mg  60 mg Oral DAILY  [Held by provider] furosemide (LASIX) injection 40 mg  40 mg IntraVENous DAILY  [Held by provider] losartan (COZAAR) tablet 100 mg  100 mg Oral DAILY  rosuvastatin (CRESTOR) tablet 5 mg  5 mg Oral EVERY OTHER DAY  sodium chloride (NS) flush 5-40 mL  5-40 mL IntraVENous Q8H  
 sodium chloride (NS) flush 5-40 mL  5-40 mL IntraVENous PRN  
 acetaminophen (TYLENOL) tablet 650 mg  650 mg Oral Q6H PRN Or  
 acetaminophen (TYLENOL) suppository 650 mg  650 mg Rectal Q6H PRN  polyethylene glycol (MIRALAX) packet 17 g  17 g Oral DAILY PRN  promethazine (PHENERGAN) tablet 12.5 mg  12.5 mg Oral Q6H PRN Or  
 ondansetron (ZOFRAN) injection 4 mg  4 mg IntraVENous Q6H PRN  
 heparin (porcine) injection 5,000 Units  5,000 Units SubCUTAneous Q8H  
 L.acidophilus-paracasei-S.thermophil-bifidobacter (RISAQUAD) 8 billion cell capsule  1 Cap Oral DAILY  predniSONE (DELTASONE) tablet 40 mg  40 mg Oral DAILY WITH BREAKFAST  azithromycin (ZITHROMAX) 500 mg in 0.9% sodium chloride 250 mL (VIAL-MATE)  500 mg IntraVENous Q24H  cefTRIAXone (ROCEPHIN) 1 g in 0.9% sodium chloride (MBP/ADV) 50 mL MBP  1 g IntraVENous Q24H  
 insulin lispro (HUMALOG) injection   SubCUTAneous AC&HS  
 glucose chewable tablet 16 g  4 Tab Oral PRN  
 dextrose (D50W) injection syrg 12.5-25 g  12.5-25 g IntraVENous PRN  
 glucagon (GLUCAGEN) injection 1 mg  1 mg IntraMUSCular PRN

## 2021-04-20 PROBLEM — J96.00 ACUTE RESPIRATORY FAILURE (HCC): Status: ACTIVE | Noted: 2021-01-01

## 2021-04-20 NOTE — PROGRESS NOTES
Problem: Diabetes Self-Management Goal: *Disease process and treatment process Description: Define diabetes and identify own type of diabetes; list 3 options for treating diabetes. Outcome: Resolved/Not Met Problem: Diabetes Self-Management Goal: *Incorporating nutritional management into lifestyle Description: Describe effect of type, amount and timing of food on blood glucose; list 3 methods for planning meals. Outcome: Resolved/Not Met Problem: Diabetes Self-Management Goal: *Incorporating physical activity into lifestyle Description: State effect of exercise on blood glucose levels. Outcome: Resolved/Not Met Problem: Diabetes Self-Management Goal: *Developing strategies to promote health/change behavior Description: Define the ABC's of diabetes; identify appropriate screenings, schedule and personal plan for screenings. Outcome: Resolved/Not Met Problem: Diabetes Self-Management Goal: *Monitoring blood glucose, interpreting and using results Description: Identify recommended blood glucose targets  and personal targets. Outcome: Resolved/Not Met Problem: Diabetes Self-Management Goal: *Prevention, detection and treatment of chronic complications Description: Define the natural course of diabetes and describe the relationship of blood glucose levels to long term complications of diabetes. Outcome: Resolved/Not Met Problem: Diabetes Self-Management Goal: *Developing strategies to address psychosocial issues Description: Describe feelings about living with diabetes; identify support needed and support network Outcome: Resolved/Not Met Problem: Diabetes Self-Management Goal: *Insulin pump training Outcome: Resolved/Not Met Problem: Diabetes Self-Management Goal: *Sick day guidelines Outcome: Resolved/Not Met Problem: Diabetes Self-Management Goal: *Patient Specific Goal (EDIT GOAL, INSERT TEXT) Outcome: Resolved/Not Met Problem: Falls - Risk of Goal: *Absence of Falls Description: Document Per Jyoa Fall Risk and appropriate interventions in the flowsheet. Outcome: Progressing Towards Goal 
Note: Fall Risk Interventions: 
  
 
  
 
  
 
  
 
  
 
 
  
Problem: Pressure Injury - Risk of 
Goal: *Prevention of pressure injury Description: Document Nahid Scale and appropriate interventions in the flowsheet. Outcome: Progressing Towards Goal 
Note: Pressure Injury Interventions: 
Sensory Interventions: Float heels, Minimize linen layers Moisture Interventions: Absorbent underpads, Minimize layers Mobility Interventions: HOB 30 degrees or less, Float heels Friction and Shear Interventions: Minimize layers, Lift sheet Problem: Patient Education: Go to Patient Education Activity Goal: Patient/Family Education Outcome: Progressing Towards Goal

## 2021-04-20 NOTE — [facility] IDG SOCIAL WORKER NOTES
This LCSW and Pennie Cho RN met with pt, who is minimally responsive, and her daughter Carlos Pete and Georgina Been. Pt has a hospice dx of ARF, pt has comorbid COPD, HTN, DM II, CKD, CHF, ans CKD IV. Bipap was removed. Pt was admitted 4/17/ 2021 from home due to worsening SOB. LCSW will provide emotional support and supportive counseling for daughters Nicole Wise, and family in processing pts sudden decline and terminal prognosis due to acute respiratory failure. LCSW will provide grief support for daughters Clover Meléndez and Idris Valiente in coping with anticipatory grief and relational loss. LCSW will provide education and resources for talking to pts grandchildren about death and dying. Final arrangements are in process, service and burial will be in [place]. Low risk for Bereavement for daughters.

## 2021-04-20 NOTE — PROGRESS NOTES
Problem: Pressure Injury - Risk of 
Goal: *Prevention of pressure injury Description: Document Nahid Scale and appropriate interventions in the flowsheet. 4/20/2021 1445 by Valery Hernández Outcome: Progressing Towards Goal 
Note: Pressure Injury Interventions: 
Sensory Interventions: Float heels, Minimize linen layers Moisture Interventions: Absorbent underpads, Minimize layers Mobility Interventions: HOB 30 degrees or less, Float heels Friction and Shear Interventions: Minimize layers, Lift sheet Problem: Patient Education: Go to Patient Education Activity Goal: Patient/Family Education 4/20/2021 1445 by Valery Hernández Outcome: Progressing Towards Goal 
  
Problem: Dyspnea Due to End of Life Goal: Demonstrate understanding of and ability to manage respiratory symptoms at end of life Outcome: Progressing Towards Goal 
  
Problem: Communication Deficit Goal: Effectively communicate symptoms, needs, and concerns Outcome: Progressing Towards Goal 
  
Problem: Imminent death Goal: Collaborate with patient/family/caregiver/interdisciplinary team to minimize and manage end of life symptoms Outcome: Progressing Towards Goal 
  
Problem: Potential for Skin Breakdown Goal: Demonstrate ability to care for skin, monitor areas of breakdown and demonstrate methods to prevent breakdown Description: Patient/family/caregiver will demonstrate ability to care for patient's skin, monitor for areas of breakdown, and demonstrate methods to prevent breakdown during hospice care. Outcome: Progressing Towards Goal 
  
Problem: Risk for Falls Goal: Free of falls during episode of care Description: Patient will be free of falls during episode of care. Outcome: Progressing Towards Goal 
  
Problem: Pain Goal: Verbalize satisfaction of level of comfort and symptom control Outcome: Progressing Towards Goal 
  
Problem: Anticipatory Grief Goal: Explore reactions to and verbalize acceptance of impending loss Description: Patient/family/caregiver will explore reactions to and verbalize acceptance of impending loss. Outcome: Progressing Towards Goal 
  
Problem: End of Life Process Goal: Demonstrate understanding of end of life processes Description: Patient/caregiver will understand end of life processes.  
Outcome: Progressing Towards Goal

## 2021-04-20 NOTE — PROGRESS NOTES
Pt lost IV access 2 RN's attempted and CCU attempted as well. White, NP notified. Last 3 Recorded Weights in this Encounter 04/18/21 0201 04/19/21 4786 04/20/21 0302 Weight: 118.7 kg (261 lb 11.2 oz) 118.4 kg (261 lb) 120.5 kg (265 lb 11.2 oz) Patient Vitals for the past 12 hrs: 
 Temp Pulse Resp BP SpO2  
04/20/21 0718 97.7 °F (36.5 °C) 79 17 110/84 99 % 04/20/21 0559     96 % 04/20/21 0534  84  117/69 97 % 04/20/21 0435  85  96/82   
04/20/21 0302 97.8 °F (36.6 °C) 96 26 (!) 85/72 94 % 04/20/21 0107     99 % 04/20/21 0006     95 % 04/19/21 2326    (!) 83/69   
04/19/21 2309 97.7 °F (36.5 °C) 83 23 (!) 77/55 92 % 04/19/21 2224    (!) 80/63   
04/19/21 2154    (!) 88/54   
04/19/21 2059     95 % 04/19/21 2055     92 % 04/19/21 2045 98.1 °F (36.7 °C) 79 16 (!) 93/52 93 % White, NP notified of low BP. Albumin ordered.

## 2021-04-20 NOTE — PROGRESS NOTES
PCCM: 
 
On continuous NIV Cr 5.08 Plan: 
 
Discussed with the hospitalist. Noted transition to hospice No role for trilogy in hospice care We will be available again to see if needed Kevin Parra PA-C

## 2021-04-20 NOTE — PROGRESS NOTES
0730: Bedside shift change report given to Trinidad Stahl (oncoming nurse) by [facility] RN (offgoing nurse). Report included the following information SBAR and Kardex. 0900: patients family wants patient to go home instead of going to ICU. MD notified. Hospice called and will come and see family and patient. Problem: Falls - Risk of 
Goal: *Absence of Falls Description: Document Ginny Brannon Fall Risk and appropriate interventions in the flowsheet. Outcome: Progressing Towards Goal 
Note: Fall Risk Interventions: 
Mobility Interventions: Bed/chair exit alarm Mentation Interventions: Door open when patient unattended, Bed/chair exit alarm Medication Interventions: Patient to call before getting OOB, Evaluate medications/consider consulting pharmacy Elimination Interventions: Call light in reach Problem: Pressure Injury - Risk of 
Goal: *Prevention of pressure injury Description: Document Nahid Scale and appropriate interventions in the flowsheet. Outcome: Progressing Towards Goal 
Note: Pressure Injury Interventions: 
Sensory Interventions: Assess need for specialty bed Moisture Interventions: Check for incontinence Q2 hours and as needed Activity Interventions: Increase time out of bed, Pressure redistribution bed/mattress(bed type), Trapeze to reposition Mobility Interventions: HOB 30 degrees or less, Pressure redistribution bed/mattress (bed type) Nutrition Interventions: Document food/fluid/supplement intake Friction and Shear Interventions: Feet elevated on foot rest, Apply protective barrier, creams and emollients Problem: Breathing Pattern - Ineffective Goal: *Absence of hypoxia Outcome: Progressing Towards Goal

## 2021-04-20 NOTE — PROGRESS NOTES
SLP Contact Note Updated: Pt is going hospice care. Will sign-off. SLP consult received and appreciated. Patient currently on continuous BiPAP and is not appropriate for PO at this time. May need to consider alternate means of nutrition if medications are challenging to get down. Will follow for patient readiness. Thank you, Mia Martinez M.Ed, CCC-SLP Speech-Language Pathologist

## 2021-04-20 NOTE — PROGRESS NOTES
1300: patient accepted to hospice. bipap removed and placed on 6L NC. Keeping patient comfortable with medication. Family in room. 1800: attempted to call report. RN to call back. 1840: TRANSFER - OUT REPORT: 
 
Verbal report given to Keanu Cruz RN(name) on [facility]  being transferred to [facility](unit) for change in patient condition(hospice) Report consisted of patients Situation, Background, Assessment and  
Recommendations(SBAR). Information from the following report(s) SBAR and Kardex was reviewed with the receiving nurse. Lines:    
 
Opportunity for questions and clarification was provided. Patient transported with: 
 [employer]

## 2021-04-20 NOTE — PROGRESS NOTES
TRANSFER - OUT REPORT: 
 
Verbal report given to Nolvia (name) on Chana Haines  being transferred to [facility] (unit) for change in patient condition(need for bipap) Report consisted of patients Situation, Background, Assessment and  
Recommendations(SBAR). Information from the following report(s) SBAR, ED Summary, Intake/Output, MAR and Recent Results was reviewed with the receiving nurse. Lines:  
Peripheral IV 04/19/21 Left Forearm (Active) Site Assessment Clean, dry, & intact 04/19/21 1911 Phlebitis Assessment 0 04/19/21 1911 Infiltration Assessment 0 04/19/21 1911 Dressing Status Clean, dry, & intact 04/19/21 1911 Dressing Type Transparent 04/19/21 1911 Hub Color/Line Status Blue;Capped 04/19/21 1911 Action Taken Open ports on tubing capped 04/19/21 1911 Alcohol Cap Used Yes 04/19/21 1911 Opportunity for questions and clarification was provided. Patient transported with: 
 Monitor O2 @ 6 liters Registered Nurse

## 2021-04-20 NOTE — PROGRESS NOTES
Hospitalist Night Cover Name: Efrain Parker YOB: 1945 Overnight update:  
  
 
Paged by RN - Critical results - ABG ordered by day team due to increased somnolence throughout the afternoon. - Unable to perform ABG, VBG instead - pH 7.11, pCO2 >95, PO2 61 
- Transfer to intermediate level of care, start BIPAP. Repeat ABG two hours post BIPAP 
- Stat labs, CBC, CMP, Mag 
- On exam, patient is alert, restless but tolerating mask with reinforcement, lung sounds diminished throughout but clear - Family member at bedside, understands goals of NIPPV and aware that if blood gas not improving intubation would be the next steap Addendum: 
- Blood pressure trending down, mild improvement with albumin earlier in shift. Suspect volume down, poor urinary output throughout the day. 5% albumin 25g ordered. - Labs reveal worsening IDALIA 
- Poor IV access, CCU RN attempting to obtain access. CVC would be ideal but hesitant to lay her flat at this time - Repeat ABG 7.16, PCO2 89, PO2 79, bicarb 31 
- Given ongoing hypotension and minimal improvement in gases, request evaluation by ICU 
 
0300 
- Evaluated by ICU team, patient voices that she does not wish for intubation - Attempted to reach out to patient's daughters to discuss goals of care, no answer, VM left 
- 22G IV left upper arm placed by myself 0630 
- Return call back from Kandice garcia, daughter. Does not wish to escalate to intubation at this time, does not believe that patient would wish for heroic measures or end stage interventions such as dialysis. Will be at the hospital around 0900 this morning to discuss goals of care. Palliative care consult placed. Sign out to Dr Meera Renner. Dacia HEINP-C, PA-C 
[phone] or [place]

## 2021-04-20 NOTE — HOSPICE
[employer] [facility] Help to Those in Need 
[phone] Patient Name: Junior Patrick YOB: 1945 Age: 66 y.o. [employer] RN Note:  Hospice consult noted. Chart reviewed. Plan of care discussed with patients nurse & care manager. In to meet with daughters Sheree Ha and Robyn. Discussed Hospice philosophy, general plan of care, levels of care, services and on call procedures. Family information packet provided & reviewed with family. Daughters would ultimately like for her to go home with hospice but at this time she presents as very uncomfortable with severe dyspnea and agitation. I suggested inpatient hospice to get her off bi-pap and medicate for better comfort and if stable tomorrow we could look at transfer home with hospice care. They are both in agreement with this plan Dr Maryam Estes approves GIP admission for respiratory failure. Thank you for the opportunity to be of service to this patient. Noe Coto RN Clinical Nurse Liaison Ignacio Silva (t)[phone]

## 2021-04-20 NOTE — PROGRESS NOTES
TRANSITION OF CARE 
RUR--24% Disposition--TBD. Referred to [address] [place] [address] 4/20/21. Transport--TBD. Patient's primary family contact: daughter caretaker Jose Guerrero [phone]// cell [phone]. Patient's secondary family contact: daughter Lorn Litten [phone]. CM received consult for hospice services. CM sent referral via [facility] to [address] [place] [address]. CM spoke to [address] [place] [address] with confirmation that consult was received and that processing of the referral has been started.

## 2021-04-20 NOTE — PROGRESS NOTES
1838: Received critical lab values from venous blood gas run by respiratory therapy. MD paged to notify. 1855: Paged MD x2 and [employer] sent thus far. Awaiting response. 1905: No response from day MD. Paged night on call. 1910: Notified Doc JILL Castillo of venous blood gas levels, orders placed

## 2021-04-20 NOTE — CONSULTS
Palliative Medicine Noted that patient has consult for hospice, want to take patient home w comfort. Will defer to hospice team, cancel palliative consult Please call as needed [phone]

## 2021-04-20 NOTE — PROGRESS NOTES
0310 - Called to evaluate the patient secondary to hypotension and less than optimal ABG on BiPAP. We had a brief discussion with the patient regarding escalation in level of care to include transfer to the ICU for possible intubation. The patient voiced that she did not want to be placed on life support. We recommend that the primary service discuss goals of care with the patient and her family and possibly include Palliative Care Services. Should she come to the ICU she will most likely require intubation, CRRT/hemodialysis, and long term care post discharge given her current state of health. We are available if needed, please feel free to call with questions.

## 2021-04-20 NOTE — PROGRESS NOTES
NAME: Prabhu Pruitt :  1945 MRN:  [id number] Assessment   :                                               Plan: IDALIA on CKD stage 4 Hyperkalemia Hyponatremia HTN 
DM2 Hypoxia Creatinine worse today 2.4 to 3.1 to 4 to 5mg/dl (follows with Dr. Octavia Enriquez; baseline creatinine 2.2; CKD from DM/HTN/NSAIDS) She may be intravascularly depleted (but whole body volume overload) K hemolyzed. On low dose Lokelma Long discussion today regarding risks/benefits of RRT I spoke to Dr. Octavia Enriquez at family's request regarding candidacy for RRT. Both of us agree that she would not do well given her other comorbidities and current hemodynamics Family understands and feel like she would not want it long term anyhow. Will pursue home hospice 
  
 
 
  
  
 
Subjective: Chief Complaint:  Ongoing clinical decline. Now on BIPAP. Family at bedside. Review of Systems: 
 
Symptom Y/N Comments  Symptom Y/N Comments Fever/Chills    Chest Pain Poor Appetite    Edema Cough    Abdominal Pain Sputum    Joint Pain SOB/BARNEY    Pruritis/Rash Nausea/vomit    Tolerating PT/OT Diarrhea    Tolerating Diet Constipation    Other Could not obtain due to: See above Objective: VITALS:  
Last 24hrs VS reviewed since prior progress note. Most recent are: 
Visit Vitals BP (!) 102/58 (BP 1 Location: Right arm, BP Patient Position: At rest) Pulse 82 Temp 97.9 °F (36.6 °C) Resp 15 Ht 5' 4\" (1.626 m) Wt 120.5 kg (265 lb 11.2 oz) SpO2 98% BMI 45.61 kg/m² Intake/Output Summary (Last 24 hours) at 2021 1326 Last data filed at 2021 1100 Gross per 24 hour Intake 20 ml Output 150 ml Net -130 ml Telemetry Reviewed: PHYSICAL EXAM: 
General: BIPAP Clear anteriorly Obese No sydnie Lab Data Reviewed: (see below) Medications Reviewed: (see below) PMH/SH reviewed - no change compared to H&P 
________________________________________________________________________ Care Plan discussed with: 
Patient Y Family  Y   
RN Y Care Manager Consultant:     
 
  Comments >50% of visit spent in counseling and coordination of care    
 
________________________________________________________________________ Andrés Bautista MD  
 
Procedures: see electronic medical records for all procedures/Xrays and details which 
were not copied into this note but were reviewed prior to creation of Plan. LABS: 
Recent Labs  
  04/20/21 0226 04/19/21 1930 WBC 7.3 8.6 HGB 10.8* 11.1*  
HCT 38.0 40.1 * 144* Recent Labs  
  04/20/21 0226 04/19/21 1930 04/19/21 
2359 * 127* 130*  
K 5.9* 4.8 4.8  
CL 90* 91* 92* CO2 27 30 29 BUN 80* 77* 69* CREA 5.08* 4.81* 4.11* * 231* 205* CA 7.4* 7.2* 7.7* MG 2.2 2.2 2.3 PHOS 9.0* 8.9* 8.8* Recent Labs  
  04/20/21 0226 04/19/21 1930 AP 87 101  
TP 5.8* 5.4* ALB 2.8* 2.5*  
GLOB 3.0 2.9 No results for input(s): INR, PTP, APTT, INREXT, INREXT in the last 72 hours. No results for input(s): FE, TIBC, PSAT, FERR in the last 72 hours. No results found for: FOL, RBCF Recent Labs  
  04/20/21 
0017 PH 7.16* PCO2 89* PO2 79* Recent Labs 04/18/21 
5465 * No components found for: [place] Lab Results Component Value Date/Time  Color YELLOW/STRAW 04/18/2021 10:40 AM  
 Appearance CLOUDY (A) 04/18/2021 10:40 AM  
 Specific gravity 1.017 04/18/2021 10:40 AM  
 pH (UA) 5.0 04/18/2021 10:40 AM  
 Protein 300 (A) 04/18/2021 10:40 AM  
 Glucose 250 (A) 04/18/2021 10:40 AM  
 Ketone Negative 04/18/2021 10:40 AM  
 Bilirubin Negative 04/18/2021 10:40 AM  
 Urobilinogen 1.0 04/18/2021 10:40 AM  
 Nitrites Negative 04/18/2021 10:40 AM  
 Leukocyte Esterase Negative 04/18/2021 10:40 AM  
 Epithelial cells FEW 04/18/2021 10:40 AM  
 Bacteria Negative 04/18/2021 10:40 AM  
 WBC 0-4 04/18/2021 10:40 AM  
 RBC 0-5 04/18/2021 10:40 AM  
 
 
MEDICATIONS: 
Current Facility-Administered Medications Medication Dose Route Frequency  albumin human 25% (BUMINATE) solution 12.5 g  12.5 g IntraVENous Q6H  
 allopurinoL (ZYLOPRIM) tablet 200 mg  200 mg Oral DAILY  sodium zirconium cyclosilicate (LOKELMA) powder packet 5 g  5 g Oral DAILY  albuterol-ipratropium (DUO-NEB) 2.5 MG-0.5 MG/3 ML  3 mL Nebulization Q6H RT  
 ammonium lactate (AMLACTIN) 12 % cream   Topical PRN  
 dextrose (D50W) injection syrg 12.5-25 g  12.5-25 g IntraVENous PRN  
 [Held by provider] amLODIPine (NORVASC) tablet 5 mg  5 mg Oral DAILY  aspirin delayed-release tablet 81 mg  81 mg Oral DAILY  [Held by provider] atenoloL (TENORMIN) tablet 25 mg  25 mg Oral DAILY  cetirizine (ZYRTEC) tablet 10 mg  10 mg Oral DAILY  [Held by provider] cloNIDine HCL (CATAPRES) tablet 0.2 mg  0.2 mg Oral BID  doxazosin (CARDURA) tablet 1 mg  1 mg Oral DAILY  DULoxetine (CYMBALTA) capsule 60 mg  60 mg Oral DAILY  [Held by provider] furosemide (LASIX) injection 40 mg  40 mg IntraVENous DAILY  [Held by provider] losartan (COZAAR) tablet 100 mg  100 mg Oral DAILY  rosuvastatin (CRESTOR) tablet 5 mg  5 mg Oral EVERY OTHER DAY  sodium chloride (NS) flush 5-40 mL  5-40 mL IntraVENous Q8H  
 sodium chloride (NS) flush 5-40 mL  5-40 mL IntraVENous PRN  
 acetaminophen (TYLENOL) tablet 650 mg  650 mg Oral Q6H PRN Or  
 acetaminophen (TYLENOL) suppository 650 mg  650 mg Rectal Q6H PRN  polyethylene glycol (MIRALAX) packet 17 g  17 g Oral DAILY PRN  promethazine (PHENERGAN) tablet 12.5 mg  12.5 mg Oral Q6H PRN Or  
 ondansetron (ZOFRAN) injection 4 mg  4 mg IntraVENous Q6H PRN  
 heparin (porcine) injection 5,000 Units  5,000 Units SubCUTAneous Q8H  
 L.acidophilus-paracasei-S.thermophil-bifidobacter (RISAQUAD) 8 billion cell capsule  1 Cap Oral DAILY  predniSONE (DELTASONE) tablet 40 mg  40 mg Oral DAILY WITH BREAKFAST  azithromycin (ZITHROMAX) 500 mg in 0.9% sodium chloride 250 mL (VIAL-MATE)  500 mg IntraVENous Q24H  cefTRIAXone (ROCEPHIN) 1 g in 0.9% sodium chloride (MBP/ADV) 50 mL MBP  1 g IntraVENous Q24H  
 insulin lispro (HUMALOG) injection   SubCUTAneous AC&HS  
 glucose chewable tablet 16 g  4 Tab Oral PRN  
 dextrose (D50W) injection syrg 12.5-25 g  12.5-25 g IntraVENous PRN  
 glucagon (GLUCAGEN) injection 1 mg  1 mg IntraMUSCular PRN

## 2021-04-20 NOTE — HOSPICE
[facility] Good Help to Those in Need 
[phone] Inpatient Nursing Admission Patient Name: Jeb Castleman YOB: 1945 Age: 66 y.o. Date of Hospice Admission: 4/20/2021 Hospice Attending Elected by Patient: Marci Soliz MD 
Primary Care Physician: Alta Guerra MD 
Admitting RN: Kayden Dhillon RN : NADIA Cheek Level of Care (GIP/Routine/Respite): GIP Facility of Care: [facility] Patient Room: 405/01 HOSPICE SUMMARY  
ER Visits/ Hospitalizations in past year: 2 Hospice Diagnosis: Acute respiratory failure ([address] [J96.00] Onset Date of Hospice Diagnosis: 4/16/2021 Summary of Disease Progression Leading to Hospice Diagnosis: 
Per Dr Trinh Khan: 
Patient was in her usual state of health until a couple of days ago when the patient developed worsening of shortness of breath.  The patient has shortness of breath at baseline, because of her COPD.  This got worse in the past couple of days.  The shortness of breath is worse with exertion such as walking and also when the patient is lying down flat.  The shortness of breath is associated with cough.  The cough is nonproductive.  The patient denies fever, rigors, or chills.  When the patient arrived at the emergency room, oxygen saturation was in high 60s.  She was then placed on 3 L of oxygen and the oxygen saturation improved to 3 L.  Although the patient has COPD, she is not on oxygen at home.  This is a new oxygen requirement.  The chest x-ray done on arrival at the emergency room shows evidence of pleural effusion and possible consolidation.  The patient received breathing treatment and was subsequently referred to the hospitalist service for evaluation for admission.  
[facility] [address] [place] [address] Patient presents with shortness of breath and hypoxia. Admitted for management of acute exacerbation of COPD as well as CHF.   Patient was started on empiric antibiotics including ceftriaxone and azithromycin and pulmonology evaluated the patient. Patient also with congestive heart failure, echo shows normal EF and has been diuresed initially but later diuresis was held due to worsening creatinine. Patient also noted to have right-sided pleural effusion with partial collapse of the middle and lower lobes of the right lungs. S/p IR thoracentesis. Patient also with history of CKD stage IV but worsening renal function during this admission. Noted hypotensive with third spacing. Started on albumin by nephrology. Patient also with mild elevation of D-dimer on admission, lower extremity duplex negative for DVT, cannot perform VQ scan due to patient's inability to lay flat. On 4/19 patient continues to decline and ABG shows respiratory acidosis and started on BiPAP. Patient clearly states she does not want to be on the ventilator and she does not want to be on dialysis. Patient continues to decline and does not improve on BiPAP. Continued goal of care discussion with the family and family decided patient on comfort measures. Later hospice was consulted and patient admitted under the hospice care. Hospice consult 4/20/21: 
Met with daughters Frankie Ayers and Robyn at bedside. Patient was on bi-pap and very restless and moaning. They wish to take her off bi-pap and focus on her comfort. Initially they were very focused on getting her home ASAP. I explained that it may be in their mother's best interest for us to admit GIP and to get her comfortable and if stable in next day or 2 get her home with hospice but made them aware that it is likely she will pass in the hospital. They agreed but very much want to get her grandchildren to see her asap. I told them that we should be able to assist them in getting them in to see her: ages 19,13 and 15. Patient was medicated with IV ativan and dilaudid and bi-pap removed. She was resting very comfortably at conclusion of my visit and family is very appreciative.   
 
Co-Morbidities: Patient Active Problem List  
Diagnosis Code  Obesity, morbid ([facility] E66.01  
 Coronary artery calcification seen on CAT scan I25.10  CKD (chronic kidney disease) stage 4, GFR 15-29 ml/min ([facility]) N18.4  History of tobacco use Z87.891  Essential hypertension I10  
 Acute respiratory failure with hypoxia ([facility]) J96.01  
 Acute respiratory failure ([facility]) J96.00 Diagnoses RELATED to the terminal prognosis: COPD, CHF Other Diagnoses: HTN, diabetes Rationale for a prognosis of life expectancy of 6 months or less if the disease follows its normal course (Disease Specific History): Junior Patrick is a 66 y.o. who was admitted to [facility]. The patient's principle diagnosis of acute respiratory failure has resulted in pursuit of hospice. Functionally, the patient's Palliative Performance Scale has declined over a period of days and is estimated at 10 Objective information that support this patients limited prognosis includes: minimally responsive, labored breathing with rhonci noted, restless, moaning in pain. .  The patient/family chose comfort measures with the support of Hospice. Patient meets for GIP LOC as evidenced by : need for frequent monitoring by RN, scheduled IV medications to manage symptoms Prognosis estimated based on 04/20/21 clinical assessment is:  
[] Few to Many Hours [x] Hours to Days  
[] Few to Many Days  
[] Days to Weeks  
[] Few to Many Weeks  
[] Weeks to Months  
[] Few to Many Months ASSESSMENT Patient self-reports:  []  Yes    [x] No 
 
SYMPTOMS: dyspnea, minimally responsive, generalized pain, terminal secretions SIGNS/PHYSICAL FINDINGS: restless, use of accessory muscles to breathe, rhonci, minimally responsive, moans to touch KARNOFSKY: 10 
 
FAST for all dementia:   
 
Learning Assessment: 
Patient Is patient willing/able to learn? No, obtunded What is the highest level of education completed?  
Learning preference (written material, demonstration, visual)? Learning barriers (ESOL, [place], poor vision)? Caregiver Is caregiver willing to learn care for patient? yes What is the highest level of education completed? Unknown Learning preference (written material, demonstration, visual)? Verbal  
Learning barriers (ESOL, [place], poor vision)? none CLINICAL INFORMATION Wt Readings from Last 3 Encounters:  
04/20/21 120.5 kg (265 lb 11.2 oz)  
11/10/20 119.3 kg (263 lb) 10/05/20 119.3 kg (263 lb) Ht Readings from Last 3 Encounters:  
04/19/21 5' 4\" (1.626 m)  
11/10/20 5' 4\" (1.626 m)  
10/30/20 5' 4\" (1.626 m) There is no height or weight on file to calculate BMI. There were no vitals taken for this visit. LAB VALUES No results found for this visit on 04/20/21 (from the past 12 hour(s)). No results found for this visit on 04/20/21 (from the past 6 hour(s)). Lab Results Component Value Date/Time Protein, total 5.8 (L) 04/20/2021 02:26 AM  
 Albumin 2.8 (L) 04/20/2021 02:26 AM  
 
 
Currently this patient has: 
[x] Supplemental O2 [x] Peripheral IV  [] PICC    [] PORT  
[] Solorzano Catheter [] NG Tube   [] PEG Tube [] Ostomy   
[] AICD: Has ICD been deactivated? [] Yes [] No:______ PLAN 1. Education of patient and family regarding end of life care and Hospice plan of care 2. Provide education and support to unit staff caring for hospice patient and family. Provide staff with direct contact information to reach hospice team [phone] 3. Provide support and frequent rounds for patient comfort and safety ongoing 4. Provide  support ongoing, continue to discuss discharge plan if patient becomes jakob and does not require acute nursing care interventions for GIP level of care 5. Provide  and bereavement support ongoing 6. Oxygen 6L via NC 
7.  Schedule dilaudid 1mg IV every 4 hour, ativan 1mg IV every 4 hours and robinul 0.2mg Iv every 4 hours to manage pain, air hunger, anxiety and terminal secretions 8. Add PRN comfort set 9. Maintain skin integrity as tolerated for hospice patient, turning and repositioning for comfort, and specialty mattress if appropriate 10. Solorzano care per hospital policy for infection prevention 11. Central line care as per hospital policy for infection prevention 12. Caregiver Support: Provide emotional support to family and education on end of life symptoms and care to be provided. Hospice Team Frequency Orders: 
Skilled Nurse -  Daily x 7 days /every other day x 7 days  with 5 PRN visits for symptom control. MSW  1 visit for initial assessment/evaluation for family support and need for volunteer services. Nusrat James  1 visit for initial assessment/evaluation for spiritual support. ADVANCE CARE PLANNING (Complete in ACP Flow Sheet) Code Status: DNR Durable DNR: [x]  Yes  []  No 
Code Status Discussed/Confirmed:yes Preference for Other Life Sustaining Treatment Discussed/Confirmed:yes Hospitalization Preference:H No flowsheet data found.  Service: [] Yes  [x]  No      [] Unknown Appropriate for Pinning Ceremony:  [] Yes     [] No 
Judaism: NON Moravian  Home: TBD 
 
DISCHARGE PLANNING 1. Discharge Plan: will likely  at this hospital but should she stabilize family would like to take her home with support of hospice 2. Family teaching:  Hospice philosophy, levels of care and services to be provided. End of life symptoms and care to be provided 3. Response to patient/family teaching: daughters verbalized understanding SOCIAL/EMOTIONAL/SPIRITUAL NEEDS Spiritual Issues Identified: none on admission, their  may visit Psych/ Social/ Emotional Issues Identified: daughters accepting of prognosis and GIP admission at this time. Caregiver Support: 
[x] Provided information on End of Life Care  
[x] Material Provided: Freddie Mcarthurelor From My Sight or [facility]  
 
CARE COORDINATION Dr. Rebecca Reyes contacted, discharge to hospice order received Dr. Krista Jones  contacted, agrees to serve as attending provider for hospice and provided verbal certification of terminal illness with life expectancy of 6 months or less. Orders for hospice admission, medications and plan of treatment received. Medication reconciliation completed. MEDS: See medication list below DME: Per hospital 
Supplies: Per hospital 
IDT communication to include MD, , SW, CH and support team 
 
ALLERGIES AND MEDICATIONS Allergies: Allergies Allergen Reactions  Ibuprofen Itching Due to kidney failure, cant take any NSAID  Codeine Itching Current Facility-Administered Medications Medication Dose Route Frequency  LORazepam (ATIVAN) injection 1 mg  1 mg IntraVENous Q30MIN PRN  
 acetaminophen (TYLENOL) tablet 650 mg  650 mg Oral Q4H PRN Or  
 acetaminophen (TYLENOL) solution 650 mg  650 mg Oral Q4H PRN  Or  
 acetaminophen (TYLENOL) suppository 650 mg  650 mg Rectal Q4H PRN  
 glycopyrrolate (ROBINUL) injection 0.2 mg  0.2 mg IntraVENous Q4H  
 bisacodyL (DULCOLAX) suppository 10 mg  10 mg Rectal DAILY PRN  
 HYDROmorphone (PF) (DILAUDID) injection 1 mg  1 mg IntraVENous Q30MIN PRN  
 HYDROmorphone (PF) (DILAUDID) injection 1 mg  1 mg IntraVENous Q4H  
 LORazepam (ATIVAN) injection 1 mg  1 mg IntraVENous Q4H

## 2021-04-20 NOTE — DISCHARGE SUMMARY
Discharge Summary PATIENT ID: Rajesh Gr MRN: [id number] YOB: 1945 DATE OF ADMISSION: 4/16/2021  3:11 PM   
DATE OF DISCHARGE: 04/20/2021 PRIMARY CARE PROVIDER: Ag Barrett MD  
 
ATTENDING PHYSICIAN: Marcelino Medrano DISCHARGING PROVIDER: Aime Sarmiento MD   
To contact this individual call [phone] and ask the  to page. If unavailable ask to be transferred the Adult Hospitalist Department. CONSULTATIONS: IP CONSULT TO CARDIOLOGY 
IP CONSULT TO NEPHROLOGY 
IP CONSULT TO UROLOGY 
IP CONSULT TO PALLIATIVE CARE - PROVIDER 
IP CONSULT TO PULMONOLOGY PROCEDURES/SURGERIES: * No surgery found * ADMITTING DIAGNOSES & HOSPITAL COURSE:  
 
HPI Patient was in her usual state of health until a couple of days ago when the patient developed worsening of shortness of breath. The patient has shortness of breath at baseline, because of her COPD. This got worse in the past couple of days. The shortness of breath is worse with exertion such as walking and also when the patient is lying down flat. The shortness of breath is associated with cough. The cough is nonproductive. The patient denies fever, rigors, or chills. When the patient arrived at the emergency room, oxygen saturation was in high 60s. She was then placed on 3 L of oxygen and the oxygen saturation improved to 3 L. Although the patient has COPD, she is not on oxygen at home. This is a new oxygen requirement. The chest x-ray done on arrival at the emergency room shows evidence of pleural effusion and possible consolidation. The patient received breathing treatment and was subsequently referred to the hospitalist service for evaluation for admission. Hospital Course Patient presents with shortness of breath and hypoxia. Admitted for management of acute exacerbation of COPD as well as CHF.   Patient was started on empiric antibiotics including ceftriaxone and azithromycin and pulmonology evaluated the patient. Patient also with congestive heart failure, echo shows normal EF and has been diuresed initially but later diuresis was held due to worsening creatinine. Patient also noted to have right-sided pleural effusion with partial collapse of the middle and lower lobes of the right lungs. S/p IR thoracentesis. Patient also with history of CKD stage IV but worsening renal function during this admission. Noted hypotensive with third spacing. Started on albumin by nephrology. Patient also with mild elevation of D-dimer on admission, lower extremity duplex negative for DVT, cannot perform VQ scan due to patient's inability to lay flat. On 4/19 patient continues to decline and ABG shows respiratory acidosis and started on BiPAP. Patient clearly states she does not want to be on the ventilator and she does not want to be on dialysis. Patient continues to decline and does not improve on BiPAP. Continued goal of care discussion with the family and family decided patient on comfort measures. Later hospice was consulted and patient admitted under the hospice care. DISCHARGE DIAGNOSES / PLAN:   
 
1. Acute hypoxic and hypercapnic respiratory failure 2. COPD exacerbation 3. CHF 4. Pleural effusion 5. Elevated D-dimer 6. Hyponatremia 7. Hyperkalemia 8. CKD stage IV 9. Hypertension 10. Diabetes 11. Dyslipidemia ADDITIONAL CARE RECOMMENDATIONS:  
 
Per hospice team. 
 
PENDING TEST RESULTS:  
At the time of discharge the following test results are still pending: None FOLLOW UP APPOINTMENTS:   
Follow-up Information Follow up With Specialties Details Why Contact Info [facility]   5/20/21 at 9:30 AM with Dr. Jasmin Lockwood at the [address] [address] Nitza Storey MD Family Medicine   [address] [place] [id number] [phone] DIET: Diet as tolerated Oral Nutritional Supplements: No Oral Supplement prescribed ACTIVITY: Activity as tolerated WOUND CARE: None EQUIPMENT needed: None DISCHARGE MEDICATIONS: 
Discharge Medication List as of 4/20/2021  1:30 PM  
  
 
 
 
NOTIFY YOUR PHYSICIAN FOR ANY OF THE FOLLOWING:  
Fever over 101 degrees for 24 hours. Chest pain, shortness of breath, fever, chills, nausea, vomiting, diarrhea, change in mentation, falling, weakness, bleeding. Severe pain or pain not relieved by medications. Or, any other signs or symptoms that you may have questions about. DISPOSITION: 
  Home With: 
 OT  PT  HH  RN  
  
 Long term SNF/Inpatient Rehab Independent/assisted living Hospice Other:  
 
 
PATIENT CONDITION AT DISCHARGE:  
 
Functional status Poor Deconditioned Independent Cognition Stacy Tammy Forgetful Dementia Catheters/lines (plus indication) Solorzano PICC   
 PEG None Code status Full code DNR   
 
PHYSICAL EXAMINATION AT DISCHARGE: 
General:          Alert, cooperative, no distress, appears stated age. HEENT:           Atraumatic, anicteric sclerae, pink conjunctivae No oral ulcers, mucosa moist, throat clear, dentition fair Neck:               Supple, symmetrical 
Lungs:             Clear to auscultation bilaterally. No Wheezing or Rhonchi. No rales. Chest wall:      No tenderness  No Accessory muscle use. Heart:              Regular  rhythm,  No  murmur   No edema Abdomen:        Soft, non-tender. Not distended. Bowel sounds normal 
Extremities:     No cyanosis. No clubbing,   
                        Skin turgor normal, Capillary refill normal 
Skin:                Not pale. Not Jaundiced  No rashes Psych:             Not anxious or agitated. Neurologic:      Alert, moves all extremities, answers questions appropriately and responds to commands CHRONIC MEDICAL DIAGNOSES: 
Problem List as of 4/20/2021 Date Reviewed: 4/16/2021        Codes Class Noted - Resolved Acute respiratory failure ([facility] ICD-10-CM: J96.00 
ICD-9-CM: 518.81  4/20/2021 - Present * (Principal) Acute respiratory failure with hypoxia [facility]) ICD-10-CM: J96.01 
ICD-9-CM: 518.81  4/16/2021 - Present Obesity, morbid ([facility] ICD-10-CM: E66.01 
ICD-9-CM: 278.01  9/13/2018 - Present Coronary artery calcification seen on CAT scan ICD-10-CM: I25.10 ICD-9-CM: 414.00  9/13/2018 - Present CKD (chronic kidney disease) stage 4, GFR 15-29 ml/min ([facility]) ICD-10-CM: N18.4 ICD-9-CM: 585.4  9/13/2018 - Present History of tobacco use ICD-10-CM: Z87.891 ICD-9-CM: V15.82  9/13/2018 - Present Essential hypertension ICD-10-CM: I10 
ICD-9-CM: 401.9  9/13/2018 - Present Greater than 60 minutes were spent with the patient on counseling and coordination of care Signed: Jeremías Castro MD 
4/20/2021 
2:30 PM

## 2021-04-20 NOTE — PROGRESS NOTES
Transition Plan of Care RUR 24%-Med 
Consult noted and sent to [facility] for Hospice. Patient resides at [address] [place] [address]. Otilia Allen RN CRM Ext [phone]

## 2021-04-21 NOTE — PROGRESS NOTES
[employer] Good Help to Those in Need 
[phone] Patient Name: Alistair Larsen YOB: 1945 Date of Provider Hospice Visit: 04/21/21 Level of Care:   [x] General Inpatient (GIP)    [] Routine   [] Respite Current Location of Care: 
[x] [facility] [] [facility] [] [facility] [] [facility] [] [facility], patient referred from: 
[] [facility] [] [facility] [] [facility] [] [facility] [] Home [] Other:  
 
Date of Original Hospice Admission: 4/20/21 Hospice Medical Director at time of admission: Nadia Mcnamara MD 
 
Principle Hospice Diagnosis: Acute respiratory failure Diagnoses RELATED to the terminal prognosis: COPD Other Diagnoses: CHF Pleural effusion status post thoracentesis CKD, hypertension, diabetes, hyperlipidemia Natalie Larsen is a 66y.o. year old who was admitted to [facility]. She has COPD and was admitted to the hospital on 4/16/2021 with shortness of breath. She was treated for COPD exacerbation and CHF. She was also treated for right-sided pleural effusion with partial collapse of the middle and lower lobes of the right lungs. She had a thoracentesis. She experienced worsening renal function during the admission. Continue to decline despite treatment and eventually required BiPAP support. The /family chose to pursue comfort measures with the support of Hospice. She is being admitted to inpatient hospice for further management of acute symptoms. Objective information:  
4/17/21 CT Chest: 
IMPRESSION 1. Moderate right pleural effusion with partial collapse of the right middle and 
lower lobes 2. 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 
hypertension. HOSPICE ASSESSMENT Active Symptoms and Issues: 
-Generalized pain 
-Labored breathing and tachypnea 
-Anxiety/agitation/restlessness 
-Secretions 
-Decreased responsiveness/Unresponsiveness 
-Hospice care PLAN 1.  GIP level of care needed for symptoms necessitating frequent skilled nursing assessment. 2. Pt is at high risk of rapid decline and death due to terminal disease process. 3. Family has requested to discontinue routine dilaudid and ativan today. We will continue to have prns available. Her symptoms may worsen as her medications are held, so close monitoring is warranted. If her symptoms are controlled without significant medication needs, then we may be able to discharge her to home soon. Family is understanding and agreeable to this. 4. For pain and labored breathing we will have Dilaudid 0.5 mg IV available prn pain, air hunger. 5. For restlessness and labored breathing we will have Ativan 0.5 mg IV available prn anxiety, agitation, restlessness. 6. We have scheduled glycopyrrolate 0.2 mg IV every 4 hours routinely to help prevent formation of new secretions. Recommend recovery positioning for drainage of current secretions. Gentle anterior suction of secretions okay (i.e. suction around lips/teeth). Recommend avoiding deep suction to prevent irritation, pain, bleeding. 7. Prn bisacodyl for constipation. 8. Recommend utilizing cool washcloth on forehead and recommend against using ice packs. Avoiding toradol for now d/t kidney dysfunction. 5.  and SW to support family needs. 10. Disposition: anticipate that pt will decline and die in the coming hours-days without stabilizing enough for care in the home setting 11. Hospice plan of care discussed with hospice team, family at bedside, I also discussed visitation policy with floor staff to help clarify for the family. 12. Total time 35 minutes with over half of time spent in counseling and coordination as summarized within this note, including counseling regarding my assessment and estimated prognosis, s/s of EOL, recommended medication mgt for present sxs, recommendations regarding need for GIP LOC and conditions in which we would consider transfer of care to home setting Prognosis estimated based on today's clinical assessment is:  
[x] Hours to Days   
[] Days to Weeks   
[] Other: 
 
 GOALS OF CARE Patient/Medical POA stated Goal of Care: optimize patient comfort 
 
[] I have reviewed and/or updated ACP information in the Advance Care Planning Navigator. This information is available in the [address] link in the patient's chart header. Primary Medical Decision Maker:  
 
Resuscitation Status: DNR If DNR is there a Durable DNR on file? : [] Yes [x] No (If no, complete Durable DNR) HISTORY History obtained from: chart review, hospice team, patient's daughter CHIEF COMPLAINT: patient cannot give as unresponsive The patient is:  
[] Verbal 
[] Nonverbal 
[x] Unresponsive HPI/SUBJECTIVE:   
4/20: Patient is unresponsive. Daughter is at bedside. She states the Dilaudid and Ativan given earlier today improved her comfort but made her quite sleepy. Is hoping that her mom can be more awake because her grandchildren are coming to visit her later today. She is interested in trying a lower dose of Dilaudid to see if this will help to keep her comfortable while possibly allowing her to be awake. Does endorse that her mom was restless prior to removal of BiPAP. 
 
4/21: patient unresponsive. Daughter is at bedside. States she wants to hold routine dilaudid and ativan for now as she wants to see if her mom may be able to wake up. She has questions about her mother's decline/prognosis. REVIEW OF SYSTEMS The following systems were: [] reviewed  [x] unable to be reviewed as pt is unresponsive Positive ROS include bold items: 
Constitutional: fatigue, weakness, in pain, short of breath Ears/nose/mouth/throat: increased airway secretions Respiratory:shortness of breath, wheezing Gastrointestinal:poor appetite, nausea, vomiting, abdominal pain, constipation, diarrhea Musculoskeletal:pain, deformities, swelling legs Neurologic:confusion, hallucinations, weakness Psychiatric:anxiety, feeling depressed, poor sleep Endocrine: increased thirst, increased hunger Adult Non-Verbal Pain Assessment Score: 0 Face [x] 0   No particular expression or smile 
[] 1   Occasional grimace, tearing, frowning, wrinkled forehead 
[] 2   Frequent grimace, tearing, frowning, wrinkled forehead Activity (movement) [x] 0   Lying quietly, normal position 
[] 1   Seeking attention through movement or slow, cautious movement 
[] 2   Restless, excessive activity and/or withdrawal reflexes Guarding 
[x] 0   Lying quietly, no positioning of hands over areas of body 
[] 1   Splinting areas of the body, tense 
[] 2   Rigid, stiff Physiology (vital signs) [x] 0   Stable vital signs [] 1   Change in any of the following: SBP > 20mm Hg; HR > 20/minute 
[] 2   Change in any of the following: SBP > 30mm Hg; HR > 25/minute Respiratory [x] 0   Baseline RR/SpO2, compliant with ventilator 
[] 1   RR > 10 above baseline, or 5% drop SpO2, mild asynchrony with ventilator 
[] 2   RR > 20 above baseline, or 10% drop SpO2, asynchrony with ventilator FUNCTIONAL ASSESSMENT Palliative Performance Scale (PPS): 10 PSYCHOSOCIAL/SPIRITUAL ASSESSMENT Active Problems: 
  Acute respiratory failure ([facility] (4/20/2021) Past Medical History:  
Diagnosis Date  COPD (chronic obstructive pulmonary disease) (HCC)  Diabetes ([facility] No past surgical history on file. Social History Tobacco Use  Smoking status: Former Smoker  Smokeless tobacco: Never Used Substance Use Topics  Alcohol use: Not Currently Comment: rarely No family history on file. Allergies Allergen Reactions  Ibuprofen Itching Due to kidney failure, cant take any NSAID  Codeine Itching Current Facility-Administered Medications Medication Dose Route Frequency  acetaminophen (TYLENOL) tablet 650 mg  650 mg Oral Q4H PRN  Or  
 acetaminophen (TYLENOL) solution 650 mg  650 mg Oral Q4H PRN  
 glycopyrrolate (ROBINUL) injection 0.2 mg  0.2 mg IntraVENous Q4H  
 bisacodyL (DULCOLAX) suppository 10 mg  10 mg Rectal DAILY PRN  
 [Held by provider] HYDROmorphone (PF) (DILAUDID) injection 0.5 mg  0.5 mg IntraVENous Q4H  
 [Held by provider] LORazepam (ATIVAN) injection 0.5 mg  0.5 mg IntraVENous Q4H  
 LORazepam (ATIVAN) injection 0.5 mg  0.5 mg IntraVENous Q30MIN PRN  
 HYDROmorphone (PF) (DILAUDID) injection 0.5 mg  0.5 mg IntraVENous Q30MIN PRN  
 
 
 PHYSICAL EXAM  
 
Wt Readings from Last 3 Encounters:  
04/20/21 120.5 kg (265 lb 11.2 oz)  
11/10/20 119.3 kg (263 lb) 10/05/20 119.3 kg (263 lb) Visit Vitals BP (!) 149/99 (BP 1 Location: Right leg, BP Patient Position: At rest) Pulse 69 Resp 16 SpO2 95% Supplemental O2  [x] Yes  [] NO Last bowel movement:  
 
Currently this patient has: 
[x] Peripheral IV [] PICC  [] PORT [] ICD [] Solorzano Catheter [] NG Tube   [] PEG Tube   
[] Rectal Tube [] Drain 
[] Other:  
 
Constitutional: lying abed, elderly, frail Eyes: lids normal, no drainage ENMT: dry mm, no exudate, nares normal 
Cardiovascular: normal, peripheral pulses palpable Respiratory: rate teens, very shallow, irregular breathing with mild secretions Gastrointestinal: soft, NT 
Musculoskeletal: no gross deformity or TTP Skin: warm, dry, no mottling Neurologic: does not wake or stir to exam 
Psychiatric: unresponsive, not restless or agitated Pertinent Lab and or Imaging Tests: 
Lab Results Component Value Date/Time  Sodium 127 (L) 04/20/2021 02:26 AM  
 Potassium 5.9 (H) 04/20/2021 02:26 AM  
 Chloride 90 (L) 04/20/2021 02:26 AM  
 CO2 27 04/20/2021 02:26 AM  
 Anion gap 10 04/20/2021 02:26 AM  
 Glucose 216 (H) 04/20/2021 02:26 AM  
 BUN 80 (H) 04/20/2021 02:26 AM  
 Creatinine 5.08 (H) 04/20/2021 02:26 AM  
 BUN/Creatinine ratio 16 04/20/2021 02:26 AM  
 GFR est AA 10 (L) 04/20/2021 02:26 AM  
 GFR est non-AA 8 (L) 04/20/2021 02:26 AM  
 Calcium 7.4 (L) 04/20/2021 02:26 AM  
 
Lab Results Component Value Date/Time Protein, total 5.8 (L) 04/20/2021 02:26 AM  
 Albumin 2.8 (L) 04/20/2021 02:26 AM  
 
   
 
Mariia Colon MD 
[facility]

## 2021-04-21 NOTE — HOSPICE
This was an initial visit to assess needs and offer support. Introduced myself to both Mrs. Tiffany Damon and her daughter, Nabila Vega was at bedside. Ms. Tiffany Damon appeared to be sleeping and did not respond to anything that was said or the knock on the door. Offered support through supportive listening as Natalie Merida shared around Mrs. Chow's  Illness and decline as well as the family's desire to be with her as much as possible. Her sister from out of town came yesterday to visit. Chayo's sister and Mrs. Chow's grandchildren are on their way over now. Natalie Merida noted that her mother is a believer. Natalie Merida sees herself as spiritual but not as tied into a traditional Yarsanism. Affirmed her journey and assured her I would hold her and the family in my prayers. Thanks for the opportunity to  to this daughter. Consuelo Prim

## 2021-04-21 NOTE — HOSPICE
[facility] Good Help to Those in Need 
[phone] Patient Name: Efrain Parker YOB: 1945 Age: 66 y.o. [facility] RN Note:   
Patient has not received any IV medications today and has been comfortable. Family would like to take her home tomorrow with support of hospice. Transport set for 2:30pm 
DME to be delivered to home between 10am -noon: bed, OBT and oxygen from [facility]  Order # [id number] SRX to be picked up from [facility] pharmacy to go home with patient Hospice nurse scheduled to be at the home at 4pm for [facility] visit Thank you for the opportunity to be of service to this patient. Shani Duarte RN Clinical Nurse Liaison [facility] [phone]

## 2021-04-21 NOTE — PROGRESS NOTES
[facility] LCSW note: This LCSW visited with pts daughter Leonela Ayers, pt remains unresponsive. LCSW provided emotional support through supportive listening. LCSW provided supportive counseling for daughter in processing pts transitions to EOL. LCSW normalized pt sleeping more, unresponsiveness. LCSW provided supportive listening for daughter as she talked about the unknowns for her mother and the family at this time. LCSW validated taking things \" a day at a time\". LCSW provided support for daughter as she talked about feelings of sadness and loss. LCSW provided information for daughter about talking to children re EOL. LCSW shared [facility] information for daughter for ongoing support. Update: Pt will going home for EOL on [facility] [place] 4/22/2021. [facility] will  at 2: 30 pm., hospice ck is scheduled for 4 pm. Family is in agreement with this plan. Transportation folder is on hard chart. Please contact [facility] [employer] with any questions or concerns. Chloe Otto LCSW, [facility] [phone]

## 2021-04-21 NOTE — HOSPICE
[address] [facility] Good Help to Those in Need 
[phone] [facility] Daily Nursing Note Patient Name: Alycia Rodriguez YOB: 1945 Age: 66 y.o. Date of Visit: 04/21/21 Facility of Care: [facility] Patient Room: 605/01 Hospice Attending: Vicky Law MD 
Hospice Diagnosis: Acute respiratory failure ([facility] [J96.00] Level of Care: GIP Current GIP Symptoms 1. Dyspnea 2. [place] ASSESSMENT & PLAN 1. Education of patient and family regarding end of life care and Hospice plan of care 2. Provide education and support to unit staff caring for hospice patient and family. Provide staff with direct contact information to reach hospice team [phone] 3. Provide support and frequent rounds for patient comfort and safety ongoing 4. Provide  support ongoing, continue to discuss discharge plan if patient becomes jakob and does not require acute nursing care interventions for GIP level of care 5. Provide  and bereavement support ongoing 6. Oxygen 6L via [place] 
7. Schedule dilaudid and ativan on hold at family request to see if she will wake, will monitor closely 8. Add PRN comfort set 9. Maintain skin integrity as tolerated for hospice patient, turning and repositioning for comfort, and specialty mattress if appropriate 10. Solorzano care per hospital policy for infection prevention 11. Central line care as per hospital policy for infection prevention 12. Caregiver Support: Provide emotional support to family and education on end of life symptoms and care to be provided. Spiritual Interventions: none at this time Psych/ Social/ Emotional Interventions: Emotional support provided to daughter at bedside. She is glad she is comfortable but concerned meds are sedating her and wants a trial of holding scheduled meds and just using PRN if needed. Order obtained from Dr Valencia Dow Care Coordination Needs: Communicate closely with floor nurses and hospice physician with frequent rounds to insure patient is comfortable Care plan and New Orders discussed / approved with Dr Maria Elena Crump MD. Description History and Chart Review Narrative History of last 24 hours that demonstrates care cannot be provided in another setting: 
IV dilaudid and ativan to managed symptoms of pain, dyspnea and anxiety What has been done to control the patient's symptoms in the last 24 hours? Dilaudid 0.5 mg IV every 4 hours Robinul 0.2mg Iv every 4 hours Ativan 0.5mg Iv every 4 hours Does the patient currently require IV medications? yes Does the patient currently require scheduled medications? On hold at family request 
Does the patient currently require a PCA? no 
 
List number of doses of PRN medications in last 24 hours: 
Medication 1: 
Number of doses: 
 
Medication 2:  
Number of doses: 
 
Medication 3:  
Number of doses: Supporting documentation for GIP need for pain control: 
[x] Frequent evaluation by a doctor, nurse practitioner, nurse  
[x] Frequent medication adjustment   
[] IVs that cannot be administered at home  
[] Aggressive pain management  
[] Complicated technical delivery of medications Supporting documentation for GIP need for symptom control: 
[]  Sudden decline necessitating intensive nursing intervention 
[]  Uncontrolled / intractable nausea or vomiting  
[]  Pathological fractures 
[]  Advanced open wounds requiring frequent skilled care [x] Unmanageable respiratory distress 
[] New or worsening delirium  
[] Delirium with behavior issues: Is 24 hour caregiver present due to safety concerns with agitation? (yes/no) [] Imminent death  with skilled nursing needs documented above DISCHARGE PLANNING 1. Discharge Plan: will likely  in the hospital but should she stabilize family will take her home with support of hospice 2. Patient/Family teaching: end of life symptoms and care to be provided 3.  Response to patient/family teaching: daughter verbalized [place] ASSESSMENT   
KARNOFSKY: 10 Prognosis estimated based on 04/21/21 clinical assessment is:  
[] Few to Many Hours [x] Hours to Days  
[] Few to Many Days  
[] Days to Weeks  
[] Few to Many Weeks  
[] Weeks to Months  
[] Few to Many Months Quality Measure: Patient self-reports:  [] Yes    [x] No 
 
ESAS:  
Time of Assessment: 0938 Pain (1-10):1 Fatigue (1-10): 10 Shortness of breath (1-10):5 Nausea (1-10): 0 Appetite (1-10): 0 Anxiety: (1-10): 0 Depression: (1-10): Well-being: (1-10): Constipation: _ Yes  _ No 
LAST BM:  
 
CLINICAL INFORMATION No data found. Currently this patient has: 
[x] Supplemental O2 [x] IV   
[] PICC [] PORT  
[] NG Tube   
[] PEG Tube  
[] Ostomy    
[] Solorzano draining _______ urine 
[] Other:  
 
SIGNS/PHYSICAL FINDINGS Skin (including wound): 
[] Warm, dry, supple, intact and color normal for race [x] Warm  
[x] Dry  
[] Cool    
[] Clammy      
[] Diaphoretic Turgor 
 [] Normal 
 [x] Decreased Color: 
 [] Pink 
 [] Pale 
 [] Cyanotic 
 [] Erythema 
 [] Jaundice [] Normal for Race 
[]  Wounds: 
 
Neuro: 
[] Lethargy 
[] Restlessness / agitation 
[] Confusion / delirium 
[] Hallucinations 
[] Responds to maximal stimulation 
[x] Unresponsive 
[] Seizures Cardiac: 
[] Dyspnea on Exertion 
[] JVD [] Murmur 
[] Palpitations [] Hypotension 
[] Hypertension 
[] Tachycardia [] Bradycardia 
[] Irregular HR 
[] Pulses Decreased 
[] Pulses Absent [x] Edema: arms and legs 2+ 
[] Mottling:       
 
Respiratory: 
Breath sounds:  
 [x] Diminished 
 [] Wheeze 
 [] Rhonchi 
 [] Rales  
[] Even and unlabored 
[] Labored:           
[] Cough 
 [] Non Productive 
 [] Productive 
  [] Description:          
[] Deep suctioned  
[x] O2 at 6 LPM 
[] High flow oxygen greater than 10 LPM 
[] Bi-Pap GI 
[] Abdomen (describe) [] Ascites 
[] Nausea 
[] Vomiting 
[] Incontinent of bowels [x] Bowel sounds yes [] Diarrhea 
[] Constipation (see above including last bowel movement) [] Checked for impaction 
[] Last BM Nutrition Diet:NPO, mouth care Appetite:  
[] Good  
[] Fair  
[] Poor  
[] Tube Feeding  
[] Voiding 
[x] Incontinent  
[] Solorzano Musculoskeletal 
[] Balance/Friendship Unsteady [x] Weak Strength:  
 [] Normal  
 [] Limited  
 [] Decreasing Activities:  
 [] Up as tolerated 
 [x] Bedridden  
 [] Specify: 
 
SAFETY [] 24 hr. Caregiver [x] Side rails ? [x] Hospital bed  
[] Reviewed Falls & Safety ALLERGIES AND MEDICATIONS Allergies: Allergies Allergen Reactions  Ibuprofen Itching Due to kidney failure, cant take any NSAID  Codeine Itching Current Facility-Administered Medications Medication Dose Route Frequency  acetaminophen (TYLENOL) tablet 650 mg  650 mg Oral Q4H PRN Or  
 acetaminophen (TYLENOL) solution 650 mg  650 mg Oral Q4H PRN  
 glycopyrrolate (ROBINUL) injection 0.2 mg  0.2 mg IntraVENous Q4H  
 bisacodyL (DULCOLAX) suppository 10 mg  10 mg Rectal DAILY PRN  
 [Held by provider] HYDROmorphone (PF) (DILAUDID) injection 0.5 mg  0.5 mg IntraVENous Q4H  
 [Held by provider] LORazepam (ATIVAN) injection 0.5 mg  0.5 mg IntraVENous Q4H  
 LORazepam (ATIVAN) injection 0.5 mg  0.5 mg IntraVENous Q30MIN PRN  
 HYDROmorphone (PF) (DILAUDID) injection 0.5 mg  0.5 mg IntraVENous Q30MIN PRN Visit Time In: 5033 Visit Time Out: 1081

## 2021-04-21 NOTE — PROGRESS NOTES
Problem: Falls - Risk of 
Goal: *Absence of Falls Description: Document Janae Alvesying Fall Risk and appropriate interventions in the flowsheet. Outcome: Progressing Towards Goal 
Note: Fall Risk Interventions: 
Mobility Interventions: Communicate number of staff needed for ambulation/transfer Mentation Interventions: Door open when patient unattended, Evaluate medications/consider consulting pharmacy, Family/sitter at bedside Medication Interventions: Patient to call before getting OOB, Evaluate medications/consider consulting pharmacy Elimination Interventions: Call light in reach, Patient to call for help with toileting needs Problem: Patient Education: Go to Patient Education Activity Goal: Patient/Family Education Outcome: Progressing Towards Goal 
  
Problem: Pressure Injury - Risk of 
Goal: *Prevention of pressure injury Description: Document Nahid Scale and appropriate interventions in the flowsheet. Outcome: Progressing Towards Goal 
Note: Pressure Injury Interventions: 
Sensory Interventions: Assess changes in LOC, Float heels, Keep linens dry and wrinkle-free Moisture Interventions: Absorbent underpads Activity Interventions: Pressure redistribution bed/mattress(bed type) Mobility Interventions: Float heels, HOB 30 degrees or less Nutrition Interventions: Document food/fluid/supplement intake Friction and Shear Interventions: Lift sheet, HOB 30 degrees or less Problem: Patient Education: Go to Patient Education Activity Goal: Patient/Family Education Outcome: Progressing Towards Goal 
  
Problem: Dyspnea Due to End of Life Goal: Demonstrate understanding of and ability to manage respiratory symptoms at end of life Outcome: Progressing Towards Goal 
  
Problem: Communication Deficit Goal: Effectively communicate symptoms, needs, and concerns Outcome: Progressing Towards Goal 
  
Problem: Imminent death Goal: Collaborate with patient/family/caregiver/interdisciplinary team to minimize and manage end of life symptoms Outcome: Progressing Towards Goal 
  
Problem: Potential for Skin Breakdown Goal: Demonstrate ability to care for skin, monitor areas of breakdown and demonstrate methods to prevent breakdown Description: Patient/family/caregiver will demonstrate ability to care for patient's skin, monitor for areas of breakdown, and demonstrate methods to prevent breakdown during hospice care. Outcome: Progressing Towards Goal 
  
Problem: Risk for Falls Goal: Free of falls during episode of care Description: Patient will be free of falls during episode of care. Outcome: Progressing Towards Goal 
  
Problem: Comfort Deficit Goal: Reduce/control pain Description: Patient will report that pain has been reduced or controlled through verbal and nonverbal means and that measures to promote comfort are effective. Outcome: Progressing Towards Goal 
  
Problem: Pain Goal: Verbalize satisfaction of level of comfort and symptom control Outcome: Progressing Towards Goal 
  
Problem: Anticipatory Grief Goal: Explore reactions to and verbalize acceptance of impending loss Description: Patient/family/caregiver will explore reactions to and verbalize acceptance of impending loss. Outcome: Progressing Towards Goal 
  
Problem: Anxiety/Agitation Goal: Verbalize and demonstrate ability to manage anxiety Description: The patient/family/caregiver will verbalize and demonstrate ability to manage the patient's anxiety throughout hospice care. Outcome: Progressing Towards Goal 
  
Problem: End of Life Process Description: Deficit related to understanding the EOL process. Goal: Demonstrate understanding of end of life processes Description: Patient/caregiver will better understand end of life processes within 5 days. Outcome: Progressing Towards Goal 
  
Problem: Emotional Support Needs Description: Deficit related to emotional support.   
Goal: Patient/family is receiving emotional support Description: Daughters Camacho Prince and Leonel Meléndez and family will have emotional support and supportive counseling in processing pts sudden decline and terminal prognosis due to acute respiratory failure within 5 days. Outcome: Progressing Towards Goal 
  
Problem: Anticipatory Grief Description: Deficit related to anticipatory grief. Goal: Grief heard and acknowledged, anxiety reduced, patient coping identified, patient/family expressed gratitude Description: Daughters Ronald Gutiérrez, and family will have grief support in coping with anticipatory grief and relational loss within 5 days.   
Outcome: Progressing Towards Goal

## 2021-04-21 NOTE — H&P
[facility] [facility] Good Help to Those in Need 
[phone] Patient Name: Vickie Meraz YOB: 1945 Date of Provider Hospice Visit: 04/20/21 Level of Care:   [x] General Inpatient (GIP)    [] Routine   [] Respite Current Location of Care: 
[x] [facility] [] [facility] [] [facility] [] [address] [place] [] Hospice [facility], patient referred from: 
[] [facility] [] [facility] [] [facility] [] [address] [place] [] Home [] Other:  
 
Date of Original Hospice Admission: 4/20/21 Hospice Medical Director at time of admission: Alondra Schneider MD 
 
Principle Hospice Diagnosis: Acute respiratory failure Diagnoses RELATED to the terminal prognosis: COPD Other Diagnoses: CHF Pleural effusion status post thoracentesis CKD, hypertension, diabetes, hyperlipidemia Richa Meraz is a 66y.o. year old who was admitted to [facility]. She has COPD and was admitted to the hospital on 4/16/2021 with shortness of breath. She was treated for COPD exacerbation and CHF. She was also treated for right-sided pleural effusion with partial collapse of the middle and lower lobes of the right lungs. She had a thoracentesis. She experienced worsening renal function during the admission. Continue to decline despite treatment and eventually required BiPAP support. The /family chose to pursue comfort measures with the support of Hospice. She is being admitted to inpatient hospice for further management of acute symptoms. Objective information:  
4/17/21 CT Chest: 
IMPRESSION 1. Moderate right pleural effusion with partial collapse of the right middle and 
lower lobes 2. 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 
hypertension. HOSPICE ASSESSMENT Active Symptoms and Issues: 
-Generalized pain 
-Labored breathing and tachypnea 
-Anxiety/agitation/restlessness 
-Secretions 
-Decreased responsiveness/Unresponsiveness 
-Hospice care PLAN 1.  GIP level of care needed for symptoms necessitating frequent skilled nursing assessment and administration of parenteral medications. Needs monitoring for need to titrate sx mgt regimen for optimization of comfort. BiPAP was removed just prior to hospice admission, and she is at high risk of worsening symptoms. 2. Pt is at high risk of rapid decline and death due to terminal disease process. 3. For pain and labored breathing we have scheduled Dilaudid 0.5 mg IV every 4 hours routinely and prn pain, air hunger. 4. For restlessness and labored breathing we have scheduled Ativan 0.5 mg IV every 4 hours routinely and prn anxiety, agitation, restlessness. 5. We have scheduled glycopyrrolate 0.2 mg IV every 4 hours routinely to help prevent formation of new secretions. Recommend recovery positioning for drainage of current secretions. Gentle anterior suction of secretions okay (i.e. suction around lips/teeth). Recommend avoiding deep suction to prevent irritation, pain, bleeding. 6. Prn bisacodyl for constipation. 7. Recommend utilizing cool washcloth on forehead and recommend against using ice packs. Avoiding toradol for now d/t kidney dysfunction. 6. Counseled family on nonverbal signs of pain and restlessness. 9. D/c all previous medications which are not contributing to comfort at this time. 8.  and SW to support family needs. 11. Disposition: anticipate that pt will decline and die in the coming hours-days without stabilizing enough for care in the home setting 12. Hospice plan of care discussed with hospice team,  
 
Prognosis estimated based on today's clinical assessment is:  
[x] Hours to Days   
[] Days to Weeks   
[] Other: 
 
 GOALS OF CARE Patient/Medical POA stated Goal of Care: optimize patient comfort 
 
[] I have reviewed and/or updated ACP information in the Advance Care Planning Navigator. This information is available in the [address] link in the patient's chart header.  
 
Primary Medical Decision Maker:  
 
Resuscitation Status: DNR If DNR is there a Durable DNR on file? : [] Yes [x] No (If no, complete Durable DNR) HISTORY History obtained from: chart review, hospice team, patient's daughter CHIEF COMPLAINT: patient cannot give as unresponsive The patient is:  
[] Verbal 
[] Nonverbal 
[x] Unresponsive HPI/SUBJECTIVE:   
4/20: Patient is unresponsive. Daughter is at bedside. She states the Dilaudid and Ativan given earlier today improved her comfort but made her quite sleepy. Is hoping that her mom can be more awake because her grandchildren are coming to visit her later today. She is interested in trying a lower dose of Dilaudid to see if this will help to keep her comfortable while possibly allowing her to be awake. Does endorse that her mom was restless prior to removal of BiPAP. REVIEW OF SYSTEMS The following systems were: [] reviewed  [x] unable to be reviewed as pt is unresponsive Positive ROS include bold items: 
Constitutional: fatigue, weakness, in pain, short of breath Ears/nose/mouth/throat: increased airway secretions Respiratory:shortness of breath, wheezing Gastrointestinal:poor appetite, nausea, vomiting, abdominal pain, constipation, diarrhea Musculoskeletal:pain, deformities, swelling legs Neurologic:confusion, hallucinations, weakness Psychiatric:anxiety, feeling depressed, poor sleep Endocrine: increased thirst, increased hunger Adult Non-Verbal Pain Assessment Score: 1 Face [x] 0   No particular expression or smile 
[] 1   Occasional grimace, tearing, frowning, wrinkled forehead 
[] 2   Frequent grimace, tearing, frowning, wrinkled forehead Activity (movement) [x] 0   Lying quietly, normal position 
[] 1   Seeking attention through movement or slow, cautious movement 
[] 2   Restless, excessive activity and/or withdrawal reflexes Guarding 
[x] 0   Lying quietly, no positioning of hands over areas of body 
[] 1   Splinting areas of the body, tense 
[] 2   Rigid, stiff Physiology (vital signs) [x] 0   Stable vital signs [] 1   Change in any of the following: SBP > 20mm Hg; HR > 20/minute 
[] 2   Change in any of the following: SBP > 30mm Hg; HR > 25/minute Respiratory 
[] 0   Baseline RR/SpO2, compliant with ventilator 
[x] 1   RR > 10 above baseline, or 5% drop SpO2, mild asynchrony with ventilator 
[] 2   RR > 20 above baseline, or 10% drop SpO2, asynchrony with ventilator FUNCTIONAL ASSESSMENT Palliative Performance Scale (PPS): 10 PSYCHOSOCIAL/SPIRITUAL ASSESSMENT Active Problems: 
  Acute respiratory failure ([facility] (4/20/2021) Past Medical History:  
Diagnosis Date  COPD (chronic obstructive pulmonary disease) (HCC)  Diabetes ([facility] No past surgical history on file. Social History Tobacco Use  Smoking status: Former Smoker  Smokeless tobacco: Never Used Substance Use Topics  Alcohol use: Not Currently Comment: rarely No family history on file. Allergies Allergen Reactions  Ibuprofen Itching Due to kidney failure, cant take any NSAID  Codeine Itching Current Facility-Administered Medications Medication Dose Route Frequency  acetaminophen (TYLENOL) tablet 650 mg  650 mg Oral Q4H PRN Or  
 acetaminophen (TYLENOL) solution 650 mg  650 mg Oral Q4H PRN  Or  
 acetaminophen (TYLENOL) suppository 650 mg  650 mg Rectal Q4H PRN  
 glycopyrrolate (ROBINUL) injection 0.2 mg  0.2 mg IntraVENous Q4H  
 bisacodyL (DULCOLAX) suppository 10 mg  10 mg Rectal DAILY PRN  
 HYDROmorphone (PF) (DILAUDID) injection 0.5 mg  0.5 mg IntraVENous Q4H  
 LORazepam (ATIVAN) injection 0.5 mg  0.5 mg IntraVENous Q4H  
 LORazepam (ATIVAN) injection 0.5 mg  0.5 mg IntraVENous Q30MIN PRN  
 HYDROmorphone (PF) (DILAUDID) injection 0.5 mg  0.5 mg IntraVENous Q30MIN PRN  
 
 
 PHYSICAL EXAM  
 
Wt Readings from Last 3 Encounters:  
04/20/21 120.5 kg (265 lb 11.2 oz)  
11/10/20 119.3 kg (263 lb) 10/05/20 119.3 kg (263 lb) There were no vitals taken for this visit. Supplemental O2  [x] Yes  [] NO Last bowel movement:  
 
Currently this patient has: 
[x] Peripheral IV [] PICC  [] PORT [] ICD [] Solorzano Catheter [] NG Tube   [] PEG Tube   
[] Rectal Tube [] Drain 
[] Other:  
 
Constitutional: lying abed, elderly, frail Eyes: lids normal, no drainage ENMT: dry mm, no exudate, nares normal 
Cardiovascular: normal, peripheral pulses palpable Respiratory: rate 20s, shallow, irregular breathing with mild secretions Gastrointestinal: soft, NT 
Musculoskeletal: no gross deformity or TTP Skin: warm, dry, no mottling Neurologic: does not wake or stir to exam 
Psychiatric: unresponsive Pertinent Lab and or Imaging Tests: 
Lab Results Component Value Date/Time Sodium 127 (L) 04/20/2021 02:26 AM  
 Potassium 5.9 (H) 04/20/2021 02:26 AM  
 Chloride 90 (L) 04/20/2021 02:26 AM  
 CO2 27 04/20/2021 02:26 AM  
 Anion gap 10 04/20/2021 02:26 AM  
 Glucose 216 (H) 04/20/2021 02:26 AM  
 BUN 80 (H) 04/20/2021 02:26 AM  
 Creatinine 5.08 (H) 04/20/2021 02:26 AM  
 BUN/Creatinine ratio 16 04/20/2021 02:26 AM  
 GFR est AA 10 (L) 04/20/2021 02:26 AM  
 GFR est non-AA 8 (L) 04/20/2021 02:26 AM  
 Calcium 7.4 (L) 04/20/2021 02:26 AM  
 
Lab Results Component Value Date/Time Protein, total 5.8 (L) 04/20/2021 02:26 AM  
 Albumin 2.8 (L) 04/20/2021 02:26 AM  
 
   
 
Rowan Modi MD 
[facility]

## 2021-04-22 NOTE — HOSPICE
[facility] [facility] Good Help to Those in Need 
[phone] Patient Name: Ji Ndiaye YOB: 1945 Age: 66 y.o. [employer] RN Note:  At bedside to see patient who has . Patient is unresponsive. Absent of breath sounds. Pupils fixed and dilated. Apical pulse absent after ausculting for 60sec TOD:9:15am 
Pronounced on behalf of Dr Corado. Stephania Quintero Thank you for the opportunity to be of service to this patient.

## 2021-04-22 NOTE — PROGRESS NOTES
Responded to notification of pt's death. Both of pt's daughters at bedside. No other family members are expected to visit. Offered presence and listened as one daughter spoke of her mother's life and relationships with others. One was at bedside when the pt  and the other daughter arrived shortly there after. One daughter expressed the need to spend time alone with pt. Chaplain Carmen, MDiv, MS, [facility] 
[phone] ([phone])

## 2021-04-22 NOTE — DISCHARGE SUMMARY
Discharge Summary [facility] Good Help to Those in Need 
[phone] Date of Admission: 4/20/2021 Date of Discharge:  04/22/21 Clarence Mariano is a 66y.o. year old who was admitted to [facility] at [address] [place] [address] with a Hospice diagnosis of Acute respiratory failure ([facility] [address] [facility] [J96.00]. Pt was admitted for [facility] level care. Per HPI: 
Clarence Mariano is a 66y.o. year old who was admitted to [facility]. She has COPD and was admitted to the hospital on 4/16/2021 with shortness of breath. She was treated for COPD exacerbation and CHF. She was also treated for right-sided pleural effusion with partial collapse of the middle and lower lobes of the right lungs. She had a thoracentesis. She experienced worsening renal function during the admission. Continue to decline despite treatment and eventually required BiPAP support. The /family chose to pursue comfort measures with the support of Hospice. She is being admitted to inpatient hospice for further management of acute symptoms. \" The patient's care was focused on comfort, and the patient passed away on 04/22/21 . Trisha Shannon MD

## 2021-04-22 NOTE — [facility] IDG CHAPLAIN NOTES
Patient: Rajesh Gr Date: 04/22/21 Time: 8:05 AM 
 
[facility]  Notes Initial spiritual care visit with patient and daughter completed by  Romie Watkins. Patient was sleeping and did not respond to the   Daughter engaged in conversation. Interventions:  Completed spiritual assessment. Encouraged life review. Offered end-of-life support with a focus on vu. Goal for next 2 weeks:  Continue to provide spiritual presence and support to encourage patient's family. Signed by: Renuka Toscano

## 2021-04-22 NOTE — [facility] IDG SOCIAL WORKER NOTES
LCSW participated in [address] with Dr. Eliana Whitten RN, Allegra Urbina RN and Sheryl Cooks. Pt remains unresponsive, family holding bedside owens. Pt passed at the start of IDG. LCSW provided support for daughters Debbi Litten and Concetta Wilkins at bedside. Gio Funes  in [facility] to serve ( [phone]). LCSW will f/u with bereavement call with daughters.

## 2021-04-22 NOTE — PROGRESS NOTES
Discharge Summary [facility] Good Help to Those in Need 
[phone] Date of Admission: 4/20/2021 Date of Discharge:  04/22/21 Susie Morgan is a 66y.o. year old who was admitted to [facility] at [facility] with a Hospice diagnosis of Acute respiratory failure ([facility] [J96.00]. Pt was admitted for [facility] level care. Per HPI: 
Susie Morgan is a 66y.o. year old who was admitted to [facility]. She has COPD and was admitted to the hospital on 4/16/2021 with shortness of breath. She was treated for COPD exacerbation and CHF. She was also treated for right-sided pleural effusion with partial collapse of the middle and lower lobes of the right lungs. She had a thoracentesis. She experienced worsening renal function during the admission. Continue to decline despite treatment and eventually required BiPAP support. The /family chose to pursue comfort measures with the support of Hospice. She is being admitted to inpatient hospice for further management of acute symptoms. \" The patient's care was focused on comfort, and the patient passed away on  04/22/21.  
 
Avis Jones MD

## 2021-04-22 NOTE — HOSPICE
[facility] LCSW death note; This LCSW visited the room upon pts passing, daughter Odessa Rasheed and Mary Child were present. LCSW affirmed grief, provided supportive listening for ventilation of feelings. LCSW and daughter Odessa Rasheed discussed [facility] arrangements. LCSW provided Bereavement services resources. Ibeth Stewart  in [facility] to serve ( [phone]).

## 2021-04-22 NOTE — PROGRESS NOTES
0915 Pt with no respirations or pulse. Hospice nurse at bedside. [id number] Paged pastoral care. [address] with pastoral care of pt's passing.

## 2021-04-23 LAB
BACTERIA SPEC CULT: NORMAL
GRAM STN SPEC: NORMAL
GRAM STN SPEC: NORMAL
SERVICE CMNT-IMP: NORMAL

## 2021-04-23 NOTE — HOSPICE
[employer] condolence call; This LCSW called pts daughter Sarah Yeboah to offer condolences and support. Left vm for her to return my call with any needs. LCSW called pts daughter Nakul Hu to offer condolences and support. LCSW provided supportive listening for daughter as she vented her feelings and frustrations. LCSW provided validated of feelings. LCSW provided support for her. LCSW affirmed her close relationship with her mother and the care she and her sister provided for her. LCSW and daughter talked about [address] [place] [address] information for ongoing support.

## 2021-05-17 LAB
BACTERIA SPEC CULT: NORMAL
SERVICE CMNT-IMP: NORMAL

## 2021-06-03 LAB
ACID FAST STN SPEC: NEGATIVE
MYCOBACTERIUM SPEC QL CULT: NEGATIVE
SPECIMEN PREPARATION: NORMAL
SPECIMEN SOURCE: NORMAL